# Patient Record
Sex: FEMALE | Race: WHITE | Employment: FULL TIME | URBAN - METROPOLITAN AREA
[De-identification: names, ages, dates, MRNs, and addresses within clinical notes are randomized per-mention and may not be internally consistent; named-entity substitution may affect disease eponyms.]

---

## 2024-10-16 ENCOUNTER — PREP FOR PROCEDURE (OUTPATIENT)
Age: 54
End: 2024-10-16

## 2024-10-16 PROBLEM — K51.90 MILD CHRONIC ULCERATIVE COLITIS (HCC): Status: ACTIVE | Noted: 2024-10-16

## 2024-10-16 NOTE — PERIOP NOTE
PAT  Received: Today  Camelia Hummel Ashaki N; Ann Sanchez  Patients surgery is scheduled for 11/26.  Needing PAT scheduled the week of 10/23    Thanks  Camelia    Pat: 10-25 @ 9;30 scheduled by Janis through \"[employer]\"

## 2024-10-25 ENCOUNTER — HOSPITAL ENCOUNTER (OUTPATIENT)
Facility: HOSPITAL | Age: 54
End: 2024-10-25
Payer: COMMERCIAL

## 2024-10-25 ENCOUNTER — HOSPITAL ENCOUNTER (OUTPATIENT)
Facility: HOSPITAL | Age: 54
Discharge: HOME | End: 2024-10-28
Payer: COMMERCIAL

## 2024-10-25 VITALS
HEIGHT: 64 IN | SYSTOLIC BLOOD PRESSURE: 116 MMHG | BODY MASS INDEX: 42.46 KG/M2 | HEART RATE: 71 BPM | DIASTOLIC BLOOD PRESSURE: 76 MMHG | TEMPERATURE: 97.9 F | WEIGHT: 248.68 LBS

## 2024-10-25 DIAGNOSIS — E66.01 MORBID OBESITY: ICD-10-CM

## 2024-10-25 LAB
ALBUMIN SERPL-MCNC: 3.4 G/DL (ref 3.5–5)
ALBUMIN/GLOB SERPL: 1.3 (ref 1.1–2.2)
ALP SERPL-CCNC: 98 U/L (ref 45–117)
ALT SERPL-CCNC: 23 U/L (ref 12–78)
ANION GAP SERPL CALC-SCNC: 5 MMOL/L (ref 2–12)
AST SERPL-CCNC: 6 U/L (ref 15–37)
BASOPHILS # BLD: 0 K/UL (ref 0–0.1)
BASOPHILS NFR BLD: 1 % (ref 0–1)
BILIRUB SERPL-MCNC: 0.3 MG/DL (ref 0.2–1)
BUN SERPL-MCNC: 17 MG/DL (ref 6–20)
BUN/CREAT SERPL: 24 (ref 12–20)
CALCIUM SERPL-MCNC: 10 MG/DL (ref 8.5–10.1)
CHLORIDE SERPL-SCNC: 107 MMOL/L (ref 97–108)
CO2 SERPL-SCNC: 28 MMOL/L (ref 21–32)
CREAT SERPL-MCNC: 0.72 MG/DL (ref 0.55–1.02)
DIFFERENTIAL METHOD BLD: ABNORMAL
EKG ATRIAL RATE: 62 BPM
EKG DIAGNOSIS: NORMAL
EKG P AXIS: 49 DEGREES
EKG P-R INTERVAL: 204 MS
EKG Q-T INTERVAL: 430 MS
EKG QRS DURATION: 98 MS
EKG QTC CALCULATION (BAZETT): 436 MS
EKG R AXIS: 68 DEGREES
EKG T AXIS: 27 DEGREES
EKG VENTRICULAR RATE: 62 BPM
EOSINOPHIL # BLD: 0.1 K/UL (ref 0–0.4)
EOSINOPHIL NFR BLD: 2 % (ref 0–7)
ERYTHROCYTE [DISTWIDTH] IN BLOOD BY AUTOMATED COUNT: 14.6 % (ref 11.5–14.5)
GLOBULIN SER CALC-MCNC: 2.7 G/DL (ref 2–4)
GLUCOSE SERPL-MCNC: 123 MG/DL (ref 65–100)
HCT VFR BLD AUTO: 40.9 % (ref 35–47)
HGB BLD-MCNC: 13.1 G/DL (ref 11.5–16)
IMM GRANULOCYTES # BLD AUTO: 0 K/UL (ref 0–0.04)
IMM GRANULOCYTES NFR BLD AUTO: 0 % (ref 0–0.5)
LYMPHOCYTES # BLD: 2.1 K/UL (ref 0.8–3.5)
LYMPHOCYTES NFR BLD: 34 % (ref 12–49)
MCH RBC QN AUTO: 24.9 PG (ref 26–34)
MCHC RBC AUTO-ENTMCNC: 32 G/DL (ref 30–36.5)
MCV RBC AUTO: 77.8 FL (ref 80–99)
MONOCYTES # BLD: 0.3 K/UL (ref 0–1)
MONOCYTES NFR BLD: 5 % (ref 5–13)
NEUTS SEG # BLD: 3.6 K/UL (ref 1.8–8)
NEUTS SEG NFR BLD: 58 % (ref 32–75)
NRBC # BLD: 0 K/UL (ref 0–0.01)
NRBC BLD-RTO: 0 PER 100 WBC
PLATELET # BLD AUTO: 210 K/UL (ref 150–400)
PMV BLD AUTO: 12.3 FL (ref 8.9–12.9)
POTASSIUM SERPL-SCNC: 3.8 MMOL/L (ref 3.5–5.1)
PROT SERPL-MCNC: 6.1 G/DL (ref 6.4–8.2)
RBC # BLD AUTO: 5.26 M/UL (ref 3.8–5.2)
SODIUM SERPL-SCNC: 140 MMOL/L (ref 136–145)
WBC # BLD AUTO: 6.1 K/UL (ref 3.6–11)

## 2024-10-25 PROCEDURE — 80053 COMPREHEN METABOLIC PANEL: CPT

## 2024-10-25 PROCEDURE — 85025 COMPLETE CBC W/AUTO DIFF WBC: CPT

## 2024-10-25 PROCEDURE — 93010 ELECTROCARDIOGRAM REPORT: CPT | Performed by: SPECIALIST

## 2024-10-25 PROCEDURE — 93005 ELECTROCARDIOGRAM TRACING: CPT

## 2024-10-25 PROCEDURE — 36415 COLL VENOUS BLD VENIPUNCTURE: CPT

## 2024-10-25 NOTE — PERIOP NOTE
[address] [address]   MAIN OR                                  [phone]    MAIN PRE OP             [phone]                                                                                AMBULATORY PRE OP          [phone]  PRE-ADMISSION TESTING    [phone]     Surgery Date:  11/26/2024       Is surgery arrival time given by surgeon?  YES  NO    If “NO”, [facility] staff will call you between 4 and 7pm the day before your surgery with your arrival time. (If your surgery is on a Monday, we will call you the Friday before.)    Call [phone] after 7pm Monday-Friday if you did not receive this call.    INSTRUCTIONS BEFORE YOUR SURGERY   When You  Arrive Arrive at [facility] Patient Access on 1st floor the day of your surgery.  Have your insurance card, photo ID,living will/advanced directive/POA (if applicable),  and any copayment (if needed)   Food   and   Drink SEE DIET INSTRUCTIONS BELOW     Absolutely NO alcohol of any kind 2 weeks before surgery and continue to avoid after surgery. Alcohol is not safe before or after surgery. Please discuss with your bariatric provider before resuming alcohol use. You must be 100% smoke free (tobacco and marijuana - smoking or edibles) for at least 3 months prior to surgery. Surgery will be cancelled if you are smoking. Stop smoking before surgery (helps w/healing and breathing).   Medications to   TAKE   Morning of Surgery MEDICATIONS TO TAKE THE MORNING OF SURGERY WITH A SIP OF WATER:   PREGABALIN, CITALOPRAM, ATORVASTATIN, EZETIMIBE, AMLODIPINE, OMEPRAZOLE  You may take these medications, IF NEEDED, the morning of surgery: LORAZEPAM,   IF NEEDED, take the following medication 4 hours prior to arrival to surgery:  BACLOFEN  ASK SURGEON/PRESCRIBING DOCTOR WHETHER TO STOP OR HOLD THE FOLLOWING MEDICATIONS:  MELOXICAM   Medications to STOP  before surgery HOLD THE FOLLOWING MEDICATIONS THE  told differently   Special Instructions Free  parking available from 6 AM until 4:30 PM.       Eating and Drinking Before Bariatric Surgery    Continue your liver shrinking diet until the night before surgery. You may eat your liver shrinking diet at the usual time on the day before your surgery.  Do NOT eat any solid foods after midnight.  You may drink clear liquids only from 12 midnight until 1 hours prior to your arrival time at the hospital on the day of your surgery. Do NOT drink alcohol.  Clear liquids include:  Water  Flavored, sugar-free water  Crystal Light, [place] or sugar free generic  Sugar-free You-Aid or generic  Decaffeinated tea or coffee  Vitamin Water Zero  Powerade Zero  Gatorade Zero  Clear Protein drinks  Diet V-8 Splash  Diet Snapple  Diet Lemonade  You may drink up to 12 ounces at one time every 4 hours between the hours of midnight and 1 hour before your arrival time at the hospital. Example- if your arrival time at the hospital is 6am, you may drink 12 ounces of clear liquids no later than 5am.  If you have any questions, please contact your surgeon's office.     Preventing Infections Before and After - Your Surgery    IMPORTANT INSTRUCTIONS      You play an important role in your health and preparation for surgery. To reduce the germs on your skin you will need to shower with CHG soap (Chorhexidine gluconate 4%) two times before surgery.    CHG soap (Hibiclens, Hex-A-Clens or store brand)  CHG soap will be provided at your Preadmission Testing (PAT) appointment.  If you do not have a PAT appointment before surgery, you may arrange to  CHG soap from our office or purchase CHG soap at a pharmacy, grocery or department store.  You need to purchase TWO 4 ounce bottles to use for your 2 showers.    Steps to follow:  Wash your hair with your normal shampoo and your body with regular soap and rinse well to remove shampoo and soap from your skin.  Wet a clean washcloth and turn off the

## 2024-10-28 ENCOUNTER — TELEPHONE (OUTPATIENT)
Age: 54
End: 2024-10-28

## 2024-10-28 NOTE — TELEPHONE ENCOUNTER
I called patient, 2 patient identifiers used. I made patient aware we need a medical release form to be completed for FMLA forms, I made her aware I will have  staff send form via [employer]. Patient verbalized understanding and stated that is great and she will get it do as soon as possible and thanked for call.

## 2024-10-29 ENCOUNTER — TELEMEDICINE (OUTPATIENT)
Age: 54
End: 2024-10-29

## 2024-10-29 DIAGNOSIS — K21.9 CHRONIC GERD: ICD-10-CM

## 2024-10-29 DIAGNOSIS — E66.01 MORBID OBESITY: Primary | ICD-10-CM

## 2024-10-29 DIAGNOSIS — I10 PRIMARY HYPERTENSION: ICD-10-CM

## 2024-10-29 DIAGNOSIS — K51.919 MILD CHRONIC ULCERATIVE COLITIS WITH COMPLICATION (HCC): ICD-10-CM

## 2024-10-29 DIAGNOSIS — G89.29 CHRONIC BACK PAIN, UNSPECIFIED BACK LOCATION, UNSPECIFIED BACK PAIN LATERALITY: ICD-10-CM

## 2024-10-29 DIAGNOSIS — M54.9 CHRONIC BACK PAIN, UNSPECIFIED BACK LOCATION, UNSPECIFIED BACK PAIN LATERALITY: ICD-10-CM

## 2024-10-29 DIAGNOSIS — E78.2 MIXED HYPERLIPIDEMIA: ICD-10-CM

## 2024-10-29 PROCEDURE — 99024 POSTOP FOLLOW-UP VISIT: CPT | Performed by: NURSE PRACTITIONER

## 2024-10-29 RX ORDER — ONDANSETRON 4 MG/1
4 TABLET, FILM COATED ORAL EVERY 8 HOURS PRN
Qty: 30 TABLET | Refills: 0 | Status: SHIPPED | OUTPATIENT
Start: 2024-10-29

## 2024-10-29 RX ORDER — OMEPRAZOLE 40 MG/1
40 CAPSULE, DELAYED RELEASE ORAL
Qty: 90 CAPSULE | Refills: 1 | Status: SHIPPED | OUTPATIENT
Start: 2024-10-29

## 2024-10-29 RX ORDER — POLYETHYLENE GLYCOL 3350 17 G/17G
17 POWDER, FOR SOLUTION ORAL DAILY
Qty: 1530 G | Refills: 0 | Status: SHIPPED | OUTPATIENT
Start: 2024-10-29 | End: 2025-01-27

## 2024-10-29 ASSESSMENT — PATIENT HEALTH QUESTIONNAIRE - PHQ9
1. LITTLE INTEREST OR PLEASURE IN DOING THINGS: NOT AT ALL
SUM OF ALL RESPONSES TO PHQ QUESTIONS 1-9: 0
SUM OF ALL RESPONSES TO PHQ QUESTIONS 1-9: 0
SUM OF ALL RESPONSES TO PHQ9 QUESTIONS 1 & 2: 0
SUM OF ALL RESPONSES TO PHQ QUESTIONS 1-9: 0
2. FEELING DOWN, DEPRESSED OR HOPELESS: NOT AT ALL
SUM OF ALL RESPONSES TO PHQ QUESTIONS 1-9: 0

## 2024-10-29 NOTE — PROGRESS NOTES
LMP  (LMP Unknown)   Todays weight is 248 lbs.   Last weight in October 241 lbs.     Plan:   1/2. Morbid Obesity- Patient is schedule for Malabsorptive Gastric bypass with Dr.Nathan Schulte on 11/26/2024 at [facility]. Counseled patient in regard to post diet restrictions, follow- up office visits, follow- up care. Patient as received educational booklet and vitamin list. Reviewed liver shrinking diet. Start date for Liver shrinking diet 11/12/2024  ERAS protocol reviewed:  Acetaminophen 1000 mg at noon and 8 pm day before surgery and may have clear liquids (no caffeine, no ETOH, no carbonation and calorie free) until 3 hours prior to surgery   Preadmission testing results reviewed with patient   Post operative prescriptions sent to pharmacy on file for AFTER surgery:     Acid suppression  Prilosec 40 mg x 30 days, then reassess need    Antiemetic Zofran 4 mg every 8 hours as needed for nausea #30   Antispasmodic na  Laxative:  Miralax 1 packet every day   DVT prophylaxis:  early ambulation and mechanical compression, non-smoker for at least 90 days   Post op pain management:  Patient is on lyrica 100 mg tid. She will continue after surgery. ; acetaminophen 1000 mg po q 8 hours prn; abdominal support / splinting; heating pad prn   Reviewed with patient that lifelong vitamin supplementation is recommended by provider as well as risks of non-compliance.    3/4 Htn and hyperlipidemia- follow up with PCP 4 weeks after surgery.   5. GERD- increased prilosec to 40 mg from 10 mg postoperatively,   6. Ulcerative Colitis- uses canasa for flares. None recently  7.Chronic back pain- takes Lyrica. .   Pt verbalized understanding and questions were answered to the best of my knowledge and ability.        Jennifer Hooper, was evaluated through a synchronous (real-time) audio-video encounter. The patient (or guardian if applicable) is aware that this is a billable service, which includes applicable co-pays. This Virtual Visit

## 2024-10-29 NOTE — PROGRESS NOTES
Identified patient with two patient identifiers (name and ). Reviewed chart in preparation for visit and have obtained necessary documentation.    Jennifer Hooper is a 53 y.o. female  Chief Complaint   Patient presents with    Follow-up     H & P,  LAPAROSCOPIC GASTRIC BYPASS WITH PARAESOPHAGEAL HERNIA REPAIR, EGD *ERAS* 24     LMP  (LMP Unknown)     1. Have you been to the ER, urgent care clinic since your last visit?  Hospitalized since your last visit?no    2. Have you seen or consulted any other health care providers outside of the [facility] since your last visit?  Include any pap smears or colon screening. no

## 2024-11-06 ENCOUNTER — CLINICAL DOCUMENTATION (OUTPATIENT)
Age: 54
End: 2024-11-06

## 2024-11-11 ENCOUNTER — TELEPHONE (OUTPATIENT)
Age: 54
End: 2024-11-11

## 2024-11-11 NOTE — TELEPHONE ENCOUNTER
Attempted to contact patient to reschedule appt on 12/19. As I was leaving , patient answered phone and rescheduled to 12/18 with SENAIT Tenorio.

## 2024-11-20 ENCOUNTER — OFFICE VISIT (OUTPATIENT)
Age: 54
End: 2024-11-20
Payer: COMMERCIAL

## 2024-11-20 VITALS
TEMPERATURE: 98.7 F | RESPIRATION RATE: 16 BRPM | BODY MASS INDEX: 40.8 KG/M2 | WEIGHT: 239 LBS | HEART RATE: 80 BPM | SYSTOLIC BLOOD PRESSURE: 121 MMHG | OXYGEN SATURATION: 97 % | HEIGHT: 64 IN | DIASTOLIC BLOOD PRESSURE: 79 MMHG

## 2024-11-20 DIAGNOSIS — E66.01 MORBID OBESITY: Primary | ICD-10-CM

## 2024-11-20 PROCEDURE — 3078F DIAST BP <80 MM HG: CPT | Performed by: SURGERY

## 2024-11-20 PROCEDURE — 99214 OFFICE O/P EST MOD 30 MIN: CPT | Performed by: SURGERY

## 2024-11-20 PROCEDURE — 3074F SYST BP LT 130 MM HG: CPT | Performed by: SURGERY

## 2024-11-20 ASSESSMENT — PATIENT HEALTH QUESTIONNAIRE - PHQ9
2. FEELING DOWN, DEPRESSED OR HOPELESS: NOT AT ALL
SUM OF ALL RESPONSES TO PHQ QUESTIONS 1-9: 0
SUM OF ALL RESPONSES TO PHQ QUESTIONS 1-9: 0
1. LITTLE INTEREST OR PLEASURE IN DOING THINGS: NOT AT ALL
SUM OF ALL RESPONSES TO PHQ QUESTIONS 1-9: 0
SUM OF ALL RESPONSES TO PHQ9 QUESTIONS 1 & 2: 0
SUM OF ALL RESPONSES TO PHQ QUESTIONS 1-9: 0

## 2024-11-20 NOTE — PROGRESS NOTES
Identified pt with two pt identifiers (name and ). Reviewed chart in preparation for visit and have obtained necessary documentation.    Jennifer Hooper is a 54 y.o. female  Chief Complaint   Patient presents with    Establish Care     MTD:Bariatric lap gastric bypass with paraesophageal hernia repair on      /79 (Site: Left Upper Arm, Position: Sitting, Cuff Size: Large Adult)   Pulse 80   Temp 98.7 °F (37.1 °C) (Oral)   Resp 16   Ht 1.626 m (5' 4\")   Wt 108.4 kg (239 lb)   LMP  (LMP Unknown)   SpO2 97%   BMI 41.02 kg/m²     1. Have you been to the ER, urgent care clinic since your last visit?  Hospitalized since your last visit?no    2. Have you seen or consulted any other health care providers outside of the [facility] since your last visit?  Include any pap smears or colon screening. no   
    Social Determinants of Health     Financial Resource Strain: Not on file   Food Insecurity: Not on file   Transportation Needs: Not on file   Physical Activity: Not on file   Stress: Not on file   Social Connections: Not on file   Intimate Partner Violence: Not on file   Housing Stability: Not on file       Family History   Problem Relation Age of Onset    High Blood Pressure Mother     Obesity Mother         HAD GASTRIC BY-PASS    Other Mother         SEPSIS D/T (COMPLICATION 20 YR LATER) BYPASS SECONDARY SURGERY    Diabetes Father     Heart Disease Father     High Blood Pressure Father     High Cholesterol Father     Prostate Cancer Father     Obesity Sister         GASTRIC BY-PASS    Atrial Fibrillation Sister         ABLATION    Gall Bladder Disease Sister     Arthritis Sister     Obesity Sister         GASTRIC BYPASS    Arthritis Brother     Lung Disease Brother     Sleep Apnea Brother     Cancer Brother         ORAL    Seizures Brother     Obesity Brother     Diabetes Brother     Anesth Problems Neg Hx        ROS   Constitutional: negative  Ears, Nose, Mouth, Throat, and Face: negative  Respiratory: negative  Cardiovascular: negative  Gastrointestinal: negative  Genitourinary:negative  Integument/Breast: negative  Hematologic/Lymphatic: negative  Behavioral/Psychiatric: negative  Allergic/Immunologic: negative      Physical Exam:     Vitals:    11/20/24 0853   BP: 121/79   Pulse: 80   Resp: 16   Temp: 98.7 °F (37.1 °C)   SpO2: 97%     Last Weight Metrics:      11/20/2024     8:53 AM 10/25/2024     9:25 AM 10/9/2024    11:41 AM 9/25/2024     2:52 PM 7/25/2024     8:13 AM 3/28/2024     3:35 PM 12/28/2021    11:18 AM   Weight Loss Metrics   Height 5' 4\" 5' 4\" 5' 3\" 5' 3\" 5' 3\" 5' 3\" 5' 3\"   Weight - Scale 239 lbs 248 lbs 11 oz 241 lbs 250 lbs 3 oz 247 lbs 242 lbs 3 oz 246 lbs   BMI (Calculated) 41.1 kg/m2 42.8 kg/m2 42.8 kg/m2 44.4 kg/m2 43.8 kg/m2 43 kg/m2 43.7 kg/m2        General - alert and oriented, no

## 2024-11-26 ENCOUNTER — HOSPITAL ENCOUNTER (INPATIENT)
Facility: HOSPITAL | Age: 54
LOS: 1 days | Discharge: HOME | DRG: 620 | End: 2024-11-27
Attending: SURGERY | Admitting: SURGERY
Payer: COMMERCIAL

## 2024-11-26 ENCOUNTER — ANESTHESIA EVENT (OUTPATIENT)
Facility: HOSPITAL | Age: 54
DRG: 620 | End: 2024-11-26
Payer: COMMERCIAL

## 2024-11-26 ENCOUNTER — ANESTHESIA (OUTPATIENT)
Facility: HOSPITAL | Age: 54
DRG: 620 | End: 2024-11-26
Payer: COMMERCIAL

## 2024-11-26 DIAGNOSIS — E66.01 MORBID OBESITY WITH BMI OF 40.0-44.9, ADULT: Primary | ICD-10-CM

## 2024-11-26 DIAGNOSIS — K44.9 PARAESOPHAGEAL HERNIA: ICD-10-CM

## 2024-11-26 PROCEDURE — 2500000003 HC RX 250 WO HCPCS: Performed by: NURSE ANESTHETIST, CERTIFIED REGISTERED

## 2024-11-26 PROCEDURE — 1200000000 HC SEMI PRIVATE

## 2024-11-26 PROCEDURE — 43644 LAP GASTRIC BYPASS/ROUX-EN-Y: CPT | Performed by: SURGERY

## 2024-11-26 PROCEDURE — 0D164ZA BYPASS STOMACH TO JEJUNUM, PERCUTANEOUS ENDOSCOPIC APPROACH: ICD-10-PCS | Performed by: SURGERY

## 2024-11-26 PROCEDURE — 7100000000 HC PACU RECOVERY - FIRST 15 MIN: Performed by: SURGERY

## 2024-11-26 PROCEDURE — 7100000001 HC PACU RECOVERY - ADDTL 15 MIN: Performed by: SURGERY

## 2024-11-26 PROCEDURE — 2500000003 HC RX 250 WO HCPCS: Performed by: SURGERY

## 2024-11-26 PROCEDURE — 43644 LAP GASTRIC BYPASS/ROUX-EN-Y: CPT | Performed by: PHYSICIAN ASSISTANT

## 2024-11-26 PROCEDURE — 2720000010 HC SURG SUPPLY STERILE: Performed by: SURGERY

## 2024-11-26 PROCEDURE — 3700000001 HC ADD 15 MINUTES (ANESTHESIA): Performed by: SURGERY

## 2024-11-26 PROCEDURE — 3700000000 HC ANESTHESIA ATTENDED CARE: Performed by: SURGERY

## 2024-11-26 PROCEDURE — C1781 MESH (IMPLANTABLE): HCPCS | Performed by: SURGERY

## 2024-11-26 PROCEDURE — 6370000000 HC RX 637 (ALT 250 FOR IP): Performed by: SURGERY

## 2024-11-26 PROCEDURE — 43282 LAP PARAESOPH HER RPR W/MESH: CPT | Performed by: SURGERY

## 2024-11-26 PROCEDURE — G0378 HOSPITAL OBSERVATION PER HR: HCPCS

## 2024-11-26 PROCEDURE — 6360000002 HC RX W HCPCS: Performed by: ANESTHESIOLOGY

## 2024-11-26 PROCEDURE — P9045 ALBUMIN (HUMAN), 5%, 250 ML: HCPCS | Performed by: NURSE ANESTHETIST, CERTIFIED REGISTERED

## 2024-11-26 PROCEDURE — 3600000004 HC SURGERY LEVEL 4 BASE: Performed by: SURGERY

## 2024-11-26 PROCEDURE — 2709999900 HC NON-CHARGEABLE SUPPLY: Performed by: SURGERY

## 2024-11-26 PROCEDURE — 6360000002 HC RX W HCPCS: Performed by: NURSE ANESTHETIST, CERTIFIED REGISTERED

## 2024-11-26 PROCEDURE — 2580000003 HC RX 258: Performed by: SURGERY

## 2024-11-26 PROCEDURE — 3600000014 HC SURGERY LEVEL 4 ADDTL 15MIN: Performed by: SURGERY

## 2024-11-26 PROCEDURE — 0BUT4JZ SUPPLEMENT DIAPHRAGM WITH SYNTHETIC SUBSTITUTE, PERCUTANEOUS ENDOSCOPIC APPROACH: ICD-10-PCS | Performed by: SURGERY

## 2024-11-26 PROCEDURE — 6360000002 HC RX W HCPCS: Performed by: SURGERY

## 2024-11-26 PROCEDURE — 88307 TISSUE EXAM BY PATHOLOGIST: CPT

## 2024-11-26 PROCEDURE — 0DJ08ZZ INSPECTION OF UPPER INTESTINAL TRACT, VIA NATURAL OR ARTIFICIAL OPENING ENDOSCOPIC: ICD-10-PCS | Performed by: SURGERY

## 2024-11-26 PROCEDURE — 43282 LAP PARAESOPH HER RPR W/MESH: CPT | Performed by: PHYSICIAN ASSISTANT

## 2024-11-26 PROCEDURE — 2580000003 HC RX 258: Performed by: NURSE ANESTHETIST, CERTIFIED REGISTERED

## 2024-11-26 DEVICE — BIO-A TISSUE REINFORCEMENT 7CMX10CM
Type: IMPLANTABLE DEVICE | Site: ABDOMEN | Status: FUNCTIONAL
Brand: GORE BIO-A TISSUE REINFORCEMENT

## 2024-11-26 RX ORDER — CITALOPRAM HYDROBROMIDE 20 MG/1
40 TABLET ORAL EVERY MORNING
Status: DISCONTINUED | OUTPATIENT
Start: 2024-11-27 | End: 2024-11-27 | Stop reason: HOSPADM

## 2024-11-26 RX ORDER — BUPIVACAINE HYDROCHLORIDE AND EPINEPHRINE 5; 5 MG/ML; UG/ML
INJECTION, SOLUTION EPIDURAL; INTRACAUDAL; PERINEURAL PRN
Status: DISCONTINUED | OUTPATIENT
Start: 2024-11-26 | End: 2024-11-26 | Stop reason: HOSPADM

## 2024-11-26 RX ORDER — DIPHENHYDRAMINE HYDROCHLORIDE 50 MG/ML
25 INJECTION INTRAMUSCULAR; INTRAVENOUS EVERY 6 HOURS PRN
Status: DISCONTINUED | OUTPATIENT
Start: 2024-11-26 | End: 2024-11-27 | Stop reason: HOSPADM

## 2024-11-26 RX ORDER — HYDRALAZINE HYDROCHLORIDE 20 MG/ML
10 INJECTION INTRAMUSCULAR; INTRAVENOUS
Status: DISCONTINUED | OUTPATIENT
Start: 2024-11-26 | End: 2024-11-26 | Stop reason: HOSPADM

## 2024-11-26 RX ORDER — MIDAZOLAM HYDROCHLORIDE 2 MG/2ML
2 INJECTION, SOLUTION INTRAMUSCULAR; INTRAVENOUS
Status: DISCONTINUED | OUTPATIENT
Start: 2024-11-26 | End: 2024-11-26 | Stop reason: HOSPADM

## 2024-11-26 RX ORDER — ROCURONIUM BROMIDE 10 MG/ML
INJECTION, SOLUTION INTRAVENOUS
Status: DISCONTINUED | OUTPATIENT
Start: 2024-11-26 | End: 2024-11-26 | Stop reason: SDUPTHER

## 2024-11-26 RX ORDER — ACETAMINOPHEN 325 MG/1
650 TABLET ORAL EVERY 6 HOURS
Status: DISCONTINUED | OUTPATIENT
Start: 2024-11-26 | End: 2024-11-27 | Stop reason: HOSPADM

## 2024-11-26 RX ORDER — PANTOPRAZOLE SODIUM 40 MG/1
40 TABLET, DELAYED RELEASE ORAL DAILY
Status: DISCONTINUED | OUTPATIENT
Start: 2024-11-26 | End: 2024-11-27 | Stop reason: HOSPADM

## 2024-11-26 RX ORDER — METRONIDAZOLE 500 MG/100ML
500 INJECTION, SOLUTION INTRAVENOUS
Status: COMPLETED | OUTPATIENT
Start: 2024-11-26 | End: 2024-11-26

## 2024-11-26 RX ORDER — SCOLOPAMINE TRANSDERMAL SYSTEM 1 MG/1
1 PATCH, EXTENDED RELEASE TRANSDERMAL
Status: DISCONTINUED | OUTPATIENT
Start: 2024-11-26 | End: 2024-11-26

## 2024-11-26 RX ORDER — MEPERIDINE HYDROCHLORIDE 25 MG/ML
12.5 INJECTION INTRAMUSCULAR; INTRAVENOUS; SUBCUTANEOUS EVERY 5 MIN PRN
Status: DISCONTINUED | OUTPATIENT
Start: 2024-11-26 | End: 2024-11-26 | Stop reason: HOSPADM

## 2024-11-26 RX ORDER — PROCHLORPERAZINE EDISYLATE 5 MG/ML
5 INJECTION INTRAMUSCULAR; INTRAVENOUS
Status: DISCONTINUED | OUTPATIENT
Start: 2024-11-26 | End: 2024-11-26 | Stop reason: HOSPADM

## 2024-11-26 RX ORDER — ALBUMIN HUMAN 50 G/1000ML
SOLUTION INTRAVENOUS
Status: DISCONTINUED | OUTPATIENT
Start: 2024-11-26 | End: 2024-11-26 | Stop reason: SDUPTHER

## 2024-11-26 RX ORDER — SIMETHICONE 80 MG
80 TABLET,CHEWABLE ORAL EVERY 6 HOURS PRN
Status: DISCONTINUED | OUTPATIENT
Start: 2024-11-26 | End: 2024-11-27 | Stop reason: HOSPADM

## 2024-11-26 RX ORDER — SODIUM CHLORIDE, SODIUM LACTATE, POTASSIUM CHLORIDE, CALCIUM CHLORIDE 600; 310; 30; 20 MG/100ML; MG/100ML; MG/100ML; MG/100ML
INJECTION, SOLUTION INTRAVENOUS
Status: DISCONTINUED | OUTPATIENT
Start: 2024-11-26 | End: 2024-11-26 | Stop reason: SDUPTHER

## 2024-11-26 RX ORDER — SODIUM CHLORIDE 0.9 % (FLUSH) 0.9 %
5-40 SYRINGE (ML) INJECTION PRN
Status: DISCONTINUED | OUTPATIENT
Start: 2024-11-26 | End: 2024-11-27 | Stop reason: HOSPADM

## 2024-11-26 RX ORDER — SODIUM CHLORIDE, SODIUM LACTATE, POTASSIUM CHLORIDE, CALCIUM CHLORIDE 600; 310; 30; 20 MG/100ML; MG/100ML; MG/100ML; MG/100ML
INJECTION, SOLUTION INTRAVENOUS CONTINUOUS
Status: DISCONTINUED | OUTPATIENT
Start: 2024-11-26 | End: 2024-11-26 | Stop reason: HOSPADM

## 2024-11-26 RX ORDER — PHENYLEPHRINE HCL IN 0.9% NACL 0.4MG/10ML
SYRINGE (ML) INTRAVENOUS
Status: DISCONTINUED | OUTPATIENT
Start: 2024-11-26 | End: 2024-11-26 | Stop reason: SDUPTHER

## 2024-11-26 RX ORDER — HYDRALAZINE HYDROCHLORIDE 20 MG/ML
20 INJECTION INTRAMUSCULAR; INTRAVENOUS EVERY 6 HOURS PRN
Status: DISCONTINUED | OUTPATIENT
Start: 2024-11-26 | End: 2024-11-27 | Stop reason: HOSPADM

## 2024-11-26 RX ORDER — DEXAMETHASONE SODIUM PHOSPHATE 4 MG/ML
INJECTION, SOLUTION INTRA-ARTICULAR; INTRALESIONAL; INTRAMUSCULAR; INTRAVENOUS; SOFT TISSUE
Status: DISCONTINUED | OUTPATIENT
Start: 2024-11-26 | End: 2024-11-26 | Stop reason: SDUPTHER

## 2024-11-26 RX ORDER — PREGABALIN 25 MG/1
100 CAPSULE ORAL 3 TIMES DAILY
Status: DISCONTINUED | OUTPATIENT
Start: 2024-11-26 | End: 2024-11-27 | Stop reason: HOSPADM

## 2024-11-26 RX ORDER — NALOXONE HYDROCHLORIDE 0.4 MG/ML
INJECTION, SOLUTION INTRAMUSCULAR; INTRAVENOUS; SUBCUTANEOUS PRN
Status: DISCONTINUED | OUTPATIENT
Start: 2024-11-26 | End: 2024-11-26 | Stop reason: HOSPADM

## 2024-11-26 RX ORDER — SODIUM CHLORIDE 9 MG/ML
INJECTION, SOLUTION INTRAVENOUS PRN
Status: DISCONTINUED | OUTPATIENT
Start: 2024-11-26 | End: 2024-11-26 | Stop reason: HOSPADM

## 2024-11-26 RX ORDER — ONDANSETRON 2 MG/ML
4 INJECTION INTRAMUSCULAR; INTRAVENOUS EVERY 6 HOURS PRN
Status: DISCONTINUED | OUTPATIENT
Start: 2024-11-26 | End: 2024-11-27 | Stop reason: HOSPADM

## 2024-11-26 RX ORDER — SODIUM CHLORIDE 0.9 % (FLUSH) 0.9 %
5-40 SYRINGE (ML) INJECTION EVERY 12 HOURS SCHEDULED
Status: DISCONTINUED | OUTPATIENT
Start: 2024-11-26 | End: 2024-11-26 | Stop reason: HOSPADM

## 2024-11-26 RX ORDER — HYDROMORPHONE HYDROCHLORIDE 1 MG/ML
0.5 INJECTION, SOLUTION INTRAMUSCULAR; INTRAVENOUS; SUBCUTANEOUS EVERY 5 MIN PRN
Status: DISCONTINUED | OUTPATIENT
Start: 2024-11-26 | End: 2024-11-26 | Stop reason: HOSPADM

## 2024-11-26 RX ORDER — DIPHENHYDRAMINE HYDROCHLORIDE 50 MG/ML
12.5 INJECTION INTRAMUSCULAR; INTRAVENOUS
Status: DISCONTINUED | OUTPATIENT
Start: 2024-11-26 | End: 2024-11-26 | Stop reason: HOSPADM

## 2024-11-26 RX ORDER — DIPHENHYDRAMINE HCL 25 MG
25 CAPSULE ORAL EVERY 6 HOURS PRN
Status: DISCONTINUED | OUTPATIENT
Start: 2024-11-26 | End: 2024-11-27 | Stop reason: HOSPADM

## 2024-11-26 RX ORDER — MIDAZOLAM HYDROCHLORIDE 1 MG/ML
INJECTION, SOLUTION INTRAMUSCULAR; INTRAVENOUS
Status: DISCONTINUED | OUTPATIENT
Start: 2024-11-26 | End: 2024-11-26 | Stop reason: SDUPTHER

## 2024-11-26 RX ORDER — AMLODIPINE BESYLATE 5 MG/1
10 TABLET ORAL EVERY MORNING
Status: DISCONTINUED | OUTPATIENT
Start: 2024-11-27 | End: 2024-11-27 | Stop reason: HOSPADM

## 2024-11-26 RX ORDER — ACETAMINOPHEN 160 MG/5ML
650 LIQUID ORAL ONCE
Status: COMPLETED | OUTPATIENT
Start: 2024-11-26 | End: 2024-11-26

## 2024-11-26 RX ORDER — SODIUM CHLORIDE 0.9 % (FLUSH) 0.9 %
5-40 SYRINGE (ML) INJECTION EVERY 12 HOURS SCHEDULED
Status: DISCONTINUED | OUTPATIENT
Start: 2024-11-26 | End: 2024-11-27 | Stop reason: HOSPADM

## 2024-11-26 RX ORDER — SODIUM CHLORIDE 0.9 % (FLUSH) 0.9 %
5-40 SYRINGE (ML) INJECTION PRN
Status: DISCONTINUED | OUTPATIENT
Start: 2024-11-26 | End: 2024-11-26 | Stop reason: HOSPADM

## 2024-11-26 RX ORDER — SODIUM CHLORIDE 9 MG/ML
INJECTION, SOLUTION INTRAVENOUS CONTINUOUS
Status: DISCONTINUED | OUTPATIENT
Start: 2024-11-26 | End: 2024-11-26 | Stop reason: HOSPADM

## 2024-11-26 RX ORDER — ONDANSETRON 2 MG/ML
4 INJECTION INTRAMUSCULAR; INTRAVENOUS
Status: DISCONTINUED | OUTPATIENT
Start: 2024-11-26 | End: 2024-11-26 | Stop reason: HOSPADM

## 2024-11-26 RX ORDER — LIDOCAINE HYDROCHLORIDE ANHYDROUS AND DEXTROSE MONOHYDRATE 5; 400 G/100ML; MG/100ML
INJECTION, SOLUTION INTRAVENOUS
Status: DISCONTINUED | OUTPATIENT
Start: 2024-11-26 | End: 2024-11-26 | Stop reason: SDUPTHER

## 2024-11-26 RX ORDER — LORAZEPAM 2 MG/ML
1 INJECTION INTRAMUSCULAR EVERY 6 HOURS PRN
Status: DISCONTINUED | OUTPATIENT
Start: 2024-11-26 | End: 2024-11-27 | Stop reason: HOSPADM

## 2024-11-26 RX ORDER — SCOLOPAMINE TRANSDERMAL SYSTEM 1 MG/1
1 PATCH, EXTENDED RELEASE TRANSDERMAL
Status: DISCONTINUED | OUTPATIENT
Start: 2024-11-26 | End: 2024-11-27 | Stop reason: HOSPADM

## 2024-11-26 RX ORDER — ONDANSETRON 4 MG/1
4 TABLET, ORALLY DISINTEGRATING ORAL EVERY 8 HOURS PRN
Status: DISCONTINUED | OUTPATIENT
Start: 2024-11-26 | End: 2024-11-27 | Stop reason: HOSPADM

## 2024-11-26 RX ORDER — FENTANYL CITRATE 50 UG/ML
INJECTION, SOLUTION INTRAMUSCULAR; INTRAVENOUS
Status: DISCONTINUED | OUTPATIENT
Start: 2024-11-26 | End: 2024-11-26 | Stop reason: SDUPTHER

## 2024-11-26 RX ORDER — EPHEDRINE SULFATE 50 MG/ML
INJECTION INTRAVENOUS
Status: DISCONTINUED | OUTPATIENT
Start: 2024-11-26 | End: 2024-11-26 | Stop reason: SDUPTHER

## 2024-11-26 RX ORDER — PROPOFOL 10 MG/ML
INJECTION, EMULSION INTRAVENOUS
Status: DISCONTINUED | OUTPATIENT
Start: 2024-11-26 | End: 2024-11-26 | Stop reason: SDUPTHER

## 2024-11-26 RX ORDER — HYDROMORPHONE HYDROCHLORIDE 1 MG/ML
1 INJECTION, SOLUTION INTRAMUSCULAR; INTRAVENOUS; SUBCUTANEOUS
Status: DISCONTINUED | OUTPATIENT
Start: 2024-11-26 | End: 2024-11-27 | Stop reason: HOSPADM

## 2024-11-26 RX ORDER — LIDOCAINE HYDROCHLORIDE 20 MG/ML
INJECTION, SOLUTION EPIDURAL; INFILTRATION; INTRACAUDAL; PERINEURAL
Status: DISCONTINUED | OUTPATIENT
Start: 2024-11-26 | End: 2024-11-26 | Stop reason: SDUPTHER

## 2024-11-26 RX ORDER — MAGNESIUM SULFATE HEPTAHYDRATE 40 MG/ML
INJECTION, SOLUTION INTRAVENOUS
Status: DISCONTINUED | OUTPATIENT
Start: 2024-11-26 | End: 2024-11-26 | Stop reason: SDUPTHER

## 2024-11-26 RX ORDER — ONDANSETRON 2 MG/ML
4 INJECTION INTRAMUSCULAR; INTRAVENOUS ONCE
Status: DISCONTINUED | OUTPATIENT
Start: 2024-11-26 | End: 2024-11-26 | Stop reason: HOSPADM

## 2024-11-26 RX ORDER — MIDAZOLAM HYDROCHLORIDE 5 MG/5ML
5 INJECTION, SOLUTION INTRAMUSCULAR; INTRAVENOUS
Status: DISCONTINUED | OUTPATIENT
Start: 2024-11-26 | End: 2024-11-26 | Stop reason: HOSPADM

## 2024-11-26 RX ORDER — ONDANSETRON 2 MG/ML
INJECTION INTRAMUSCULAR; INTRAVENOUS
Status: DISCONTINUED | OUTPATIENT
Start: 2024-11-26 | End: 2024-11-26 | Stop reason: SDUPTHER

## 2024-11-26 RX ORDER — FENTANYL CITRATE 50 UG/ML
50 INJECTION, SOLUTION INTRAMUSCULAR; INTRAVENOUS EVERY 5 MIN PRN
Status: DISCONTINUED | OUTPATIENT
Start: 2024-11-26 | End: 2024-11-26 | Stop reason: HOSPADM

## 2024-11-26 RX ORDER — LISINOPRIL 20 MG/1
20 TABLET ORAL DAILY
Status: DISCONTINUED | OUTPATIENT
Start: 2024-11-26 | End: 2024-11-27 | Stop reason: HOSPADM

## 2024-11-26 RX ORDER — SUCCINYLCHOLINE/SOD CL,ISO/PF 200MG/10ML
SYRINGE (ML) INTRAVENOUS
Status: DISCONTINUED | OUTPATIENT
Start: 2024-11-26 | End: 2024-11-26 | Stop reason: SDUPTHER

## 2024-11-26 RX ORDER — FENTANYL CITRATE 50 UG/ML
100 INJECTION, SOLUTION INTRAMUSCULAR; INTRAVENOUS
Status: DISCONTINUED | OUTPATIENT
Start: 2024-11-26 | End: 2024-11-26 | Stop reason: HOSPADM

## 2024-11-26 RX ORDER — SODIUM CHLORIDE 9 MG/ML
INJECTION, SOLUTION INTRAVENOUS CONTINUOUS
Status: DISCONTINUED | OUTPATIENT
Start: 2024-11-26 | End: 2024-11-27 | Stop reason: HOSPADM

## 2024-11-26 RX ORDER — SODIUM CHLORIDE 9 MG/ML
INJECTION, SOLUTION INTRAVENOUS PRN
Status: DISCONTINUED | OUTPATIENT
Start: 2024-11-26 | End: 2024-11-27 | Stop reason: HOSPADM

## 2024-11-26 RX ADMIN — FENTANYL CITRATE 50 MCG: 50 INJECTION INTRAMUSCULAR; INTRAVENOUS at 12:00

## 2024-11-26 RX ADMIN — SIMETHICONE 80 MG: 80 TABLET, CHEWABLE ORAL at 13:16

## 2024-11-26 RX ADMIN — EPHEDRINE SULFATE 5 MG: 50 INJECTION INTRAVENOUS at 10:05

## 2024-11-26 RX ADMIN — ONDANSETRON 4 MG: 2 INJECTION INTRAMUSCULAR; INTRAVENOUS at 10:30

## 2024-11-26 RX ADMIN — SUGAMMADEX 200 MG: 100 INJECTION, SOLUTION INTRAVENOUS at 10:49

## 2024-11-26 RX ADMIN — Medication 120 MG: at 07:36

## 2024-11-26 RX ADMIN — MIDAZOLAM HYDROCHLORIDE 3 MG: 1 INJECTION, SOLUTION INTRAMUSCULAR; INTRAVENOUS at 07:26

## 2024-11-26 RX ADMIN — ALBUMIN (HUMAN) 12.5 G: 12.5 INJECTION, SOLUTION INTRAVENOUS at 10:06

## 2024-11-26 RX ADMIN — PROPOFOL 60 MG: 10 INJECTION, EMULSION INTRAVENOUS at 07:37

## 2024-11-26 RX ADMIN — EPHEDRINE SULFATE 5 MG: 50 INJECTION INTRAVENOUS at 08:10

## 2024-11-26 RX ADMIN — EPHEDRINE SULFATE 5 MG: 50 INJECTION INTRAVENOUS at 08:14

## 2024-11-26 RX ADMIN — ACETAMINOPHEN 650 MG: 325 TABLET ORAL at 19:10

## 2024-11-26 RX ADMIN — PREGABALIN 100 MG: 25 CAPSULE ORAL at 20:38

## 2024-11-26 RX ADMIN — PANTOPRAZOLE SODIUM 40 MG: 40 TABLET, DELAYED RELEASE ORAL at 13:20

## 2024-11-26 RX ADMIN — LISINOPRIL 20 MG: 20 TABLET ORAL at 13:20

## 2024-11-26 RX ADMIN — MAGNESIUM SULFATE HEPTAHYDRATE 2000 MG: 40 INJECTION, SOLUTION INTRAVENOUS at 07:57

## 2024-11-26 RX ADMIN — FENTANYL CITRATE 100 MCG: 50 INJECTION, SOLUTION INTRAMUSCULAR; INTRAVENOUS at 07:35

## 2024-11-26 RX ADMIN — SODIUM CHLORIDE: 9 INJECTION, SOLUTION INTRAVENOUS at 19:16

## 2024-11-26 RX ADMIN — Medication 200 MCG: at 10:06

## 2024-11-26 RX ADMIN — ROCURONIUM BROMIDE 20 MG: 10 INJECTION, SOLUTION INTRAVENOUS at 08:37

## 2024-11-26 RX ADMIN — ACETAMINOPHEN 650 MG: 650 SOLUTION ORAL at 07:12

## 2024-11-26 RX ADMIN — Medication 200 MCG: at 10:04

## 2024-11-26 RX ADMIN — ACETAMINOPHEN 650 MG: 325 TABLET ORAL at 13:30

## 2024-11-26 RX ADMIN — HYDROMORPHONE HYDROCHLORIDE 0.5 MG: 1 INJECTION, SOLUTION INTRAMUSCULAR; INTRAVENOUS; SUBCUTANEOUS at 10:44

## 2024-11-26 RX ADMIN — PROPOFOL 30 MG: 10 INJECTION, EMULSION INTRAVENOUS at 10:49

## 2024-11-26 RX ADMIN — LIDOCAINE HYDROCHLORIDE 1.5 MG/KG/HR: 4 INJECTION, SOLUTION INTRAVENOUS at 07:40

## 2024-11-26 RX ADMIN — PHENYLEPHRINE HYDROCHLORIDE 40 MCG/MIN: 10 INJECTION INTRAVENOUS at 07:50

## 2024-11-26 RX ADMIN — PROPOFOL 200 MG: 10 INJECTION, EMULSION INTRAVENOUS at 07:35

## 2024-11-26 RX ADMIN — Medication 120 MCG: at 07:43

## 2024-11-26 RX ADMIN — ROCURONIUM BROMIDE 20 MG: 10 INJECTION, SOLUTION INTRAVENOUS at 09:18

## 2024-11-26 RX ADMIN — ROCURONIUM BROMIDE 35 MG: 10 INJECTION, SOLUTION INTRAVENOUS at 07:45

## 2024-11-26 RX ADMIN — PREGABALIN 100 MG: 25 CAPSULE ORAL at 13:20

## 2024-11-26 RX ADMIN — Medication 50 MG: at 07:40

## 2024-11-26 RX ADMIN — DEXAMETHASONE SODIUM PHOSPHATE 8 MG: 4 INJECTION, SOLUTION INTRAMUSCULAR; INTRAVENOUS at 07:52

## 2024-11-26 RX ADMIN — EPHEDRINE SULFATE 5 MG: 50 INJECTION INTRAVENOUS at 09:21

## 2024-11-26 RX ADMIN — LIDOCAINE HYDROCHLORIDE 100 MG: 20 INJECTION, SOLUTION EPIDURAL; INFILTRATION; INTRACAUDAL; PERINEURAL at 07:35

## 2024-11-26 RX ADMIN — ROCURONIUM BROMIDE 5 MG: 10 INJECTION, SOLUTION INTRAVENOUS at 07:35

## 2024-11-26 RX ADMIN — MIDAZOLAM HYDROCHLORIDE 2 MG: 1 INJECTION, SOLUTION INTRAMUSCULAR; INTRAVENOUS at 07:30

## 2024-11-26 RX ADMIN — WATER 2000 MG: 1 INJECTION INTRAMUSCULAR; INTRAVENOUS; SUBCUTANEOUS at 07:46

## 2024-11-26 RX ADMIN — SODIUM CHLORIDE, POTASSIUM CHLORIDE, SODIUM LACTATE AND CALCIUM CHLORIDE: 600; 310; 30; 20 INJECTION, SOLUTION INTRAVENOUS at 07:29

## 2024-11-26 RX ADMIN — EPHEDRINE SULFATE 10 MG: 50 INJECTION INTRAVENOUS at 07:43

## 2024-11-26 RX ADMIN — ROCURONIUM BROMIDE 10 MG: 10 INJECTION, SOLUTION INTRAVENOUS at 10:25

## 2024-11-26 RX ADMIN — ROCURONIUM BROMIDE 10 MG: 10 INJECTION, SOLUTION INTRAVENOUS at 08:12

## 2024-11-26 RX ADMIN — METRONIDAZOLE 500 MG: 5 INJECTION, SOLUTION INTRAVENOUS at 07:51

## 2024-11-26 ASSESSMENT — LIFESTYLE VARIABLES: SMOKING_STATUS: 1

## 2024-11-26 ASSESSMENT — PAIN DESCRIPTION - ORIENTATION: ORIENTATION: RIGHT;LEFT;ANTERIOR;MID;LOWER

## 2024-11-26 ASSESSMENT — PAIN DESCRIPTION - DESCRIPTORS: DESCRIPTORS: ACHING;SORE;CRAMPING;TENDER

## 2024-11-26 ASSESSMENT — PAIN SCALES - GENERAL
PAINLEVEL_OUTOF10: 0
PAINLEVEL_OUTOF10: 2
PAINLEVEL_OUTOF10: 2

## 2024-11-26 ASSESSMENT — PAIN - FUNCTIONAL ASSESSMENT: PAIN_FUNCTIONAL_ASSESSMENT: 0-10

## 2024-11-26 ASSESSMENT — PAIN DESCRIPTION - LOCATION: LOCATION: ABDOMEN

## 2024-11-26 NOTE — ANESTHESIA POSTPROCEDURE EVALUATION
Department of Anesthesiology  Postprocedure Note    Patient: Jennifer Hooper  MRN: [id number]  YOB: 1970  Date of evaluation: 11/26/2024    Procedure Summary       Date: 11/26/24 Room / Location: [facility] MAIN OR [address] [place] [address] MAIN OR    Anesthesia Start: 0726 Anesthesia Stop: 1106    Procedure: LAPAROSCOPIC GASTRIC BYPASS WITH PARAESOPHAGEAL HERNIA REPAIR WITH MESH, ESOPHAGOGASTRODUODENOSCOPY (EGD) (E R A S) (Abdomen) Diagnosis:       Morbid obesity      Esophageal reflux      Mild chronic ulcerative colitis (HCC)      (Morbid obesity [E66.01])      (Esophageal reflux [K21.9])      (Mild chronic ulcerative colitis (HCC) [K51.90])    Providers: Isaac Schulte MD Responsible Provider: Humble Varma DO    Anesthesia Type: general ASA Status: 3            Anesthesia Type: No value filed.    Ciara Phase I:      Ciara Phase II:      Anesthesia Post Evaluation    Patient location during evaluation: PACU  Patient participation: complete - patient participated  Level of consciousness: awake  Pain score: 0  Airway patency: patent  Nausea & Vomiting: no nausea  Cardiovascular status: hemodynamically stable  Respiratory status: acceptable  Hydration status: euvolemic  Comments: Seen, no complaints   Pain management: adequate    No notable events documented.

## 2024-11-26 NOTE — FLOWSHEET NOTE
11/26/24 0823   Family Communication    Relationship to Patient Spouse    Phone Number any Hooper [phone]   Family/Significant Other Update Called;Updated   Delivery Origin Nurse   Message Disposition Family present - message delivered   Update Given Yes   Family Communication   Family Update Message Procedure started;Surgeon working;Patient stable

## 2024-11-26 NOTE — FLOWSHEET NOTE
11/26/24 1019   Family Communication    Relationship to Patient Spouse   Family/Significant Other Update Called   Delivery Origin Nurse   Message Disposition Family present - message delivered   Update Given Yes   Family Communication   Family Update Message Surgeon working;Patient stable

## 2024-11-26 NOTE — H&P
[facility] History and Physical     History of Present Illness:      Jennifer Hooper is a 54 y.o. female who has been preparing for laparoscopic Bruinlda-en-Y gastric bypass and paraesophageal hernia repair.  She has been cleared for surgery.  She has a history of ulcerative colitis but has not had any issues and not needing any maintenance medications and has been cleared by her GI specialist and had a colonoscopy.     Past Medical History        Past Medical History:   Diagnosis Date    Anemia      Anxiety 2022    Arthritis       KNEES    Chronic back pain 2021    Depression 2022    GERD (gastroesophageal reflux disease)      Hyperlipidemia      Hypertension 2013    Kidney stones      Melanoma of scalp (HCC)      Morbid obesity with BMI of 40.0-44.9, adult      Multiple personality disorder (HCC)      Ulcerative colitis (HCC)              Past Surgical History         Past Surgical History:   Procedure Laterality Date    HYSTERECTOMY, TOTAL ABDOMINAL (CERVIX REMOVED)   2012 December    UPPER GASTROINTESTINAL ENDOSCOPY   2010              Current Medication      Current Outpatient Medications:     polyethylene glycol (GLYCOLAX) 17 GM/SCOOP powder, Take 17 g by mouth daily, Disp: 1530 g, Rfl: 0    ondansetron (ZOFRAN) 4 MG tablet, Take 1 tablet by mouth every 8 hours as needed for Nausea or Vomiting, Disp: 30 tablet, Rfl: 0    omeprazole (PRILOSEC) 40 MG delayed release capsule, Take 1 capsule by mouth every morning (before breakfast), Disp: 90 capsule, Rfl: 1    CALCIUM PO, Take 600 mg by mouth 2 times daily, Disp: , Rfl:     pregabalin (LYRICA) 100 MG capsule, Take 1 capsule by mouth 3 times daily., Disp: , Rfl:     atorvastatin (LIPITOR) 20 MG tablet, Take 1 tablet by mouth every morning, Disp: , Rfl:     ezetimibe (ZETIA) 10 MG tablet, Take 1 tablet by mouth every morning (before breakfast), Disp: , Rfl:     lisinopril-hydroCHLOROthiazide (PRINZIDE;ZESTORETIC) 20-12.5

## 2024-11-26 NOTE — BRIEF OP NOTE
Brief Postoperative Note      Patient: Jennifer Hooper  YOB: 1970  MRN: [id number]    Date of Procedure: 11/26/2024    Pre-Op Diagnosis Codes:      * Morbid obesity [E66.01]     * Esophageal reflux [K21.9]     * Mild chronic ulcerative colitis (HCC) [K51.90]    Post-Op Diagnosis: Same       Procedure(s):  LAPAROSCOPIC GASTRIC BYPASS WITH PARAESOPHAGEAL HERNIA REPAIR WITH MESH, ESOPHAGOGASTRODUODENOSCOPY (EGD) (E R A S)    Surgeon(s):  Isaac Schulte MD    Assistant:  Physician Assistant: Judy Tay PA    Anesthesia: General    Estimated Blood Loss (mL): Minimal    Complications: None    Specimens:   ID Type Source Tests Collected by Time Destination   1 :  Tissue Tissue SURGICAL PATHOLOGY Isaac Schulte MD 11/26/2024 1000        Implants:  Implant Name Type Inv. Item Serial No.  Lot No. LRB No. Used Action   MESH JEANNE [id number] SYN TISS REINF BIOABSRB DISP BIO-A - [id number] Mesh MESH JEANNE [id number] SYN TISS REINF BIOABSRB DISP BIO-A [id number]  [employer]- NA N/A 1 Implanted         Drains: * No LDAs found *    Findings:  Infection Present At Time Of Surgery (PATOS) (choose all levels that have infection present):  No infection present  Other Findings: small/med paraesophageal hernia 3cm, repaired primarily and with 7x10cm BIO-A mesh.  Normal gastric bypass 150cm Brunilda limb, 50cm BP limb, antecolic antegastric bypass, normal post op EGD, negative leak test    Electronically signed by Isaac Schulte MD on 11/26/2024 at 10:46 AM

## 2024-11-26 NOTE — PROGRESS NOTES
Report given to ADRIANA Srivastava on 5E. VSS. Pt transferred via bed to room 519. Notified surgical waiting.

## 2024-11-26 NOTE — ANESTHESIA PRE PROCEDURE
from Last 3 Encounters:   11/26/24 110.8 kg (244 lb 4.3 oz)   11/20/24 108.4 kg (239 lb)   10/25/24 112.8 kg (248 lb 10.9 oz)     Body mass index is 41.93 kg/m².    CBC:   Lab Results   Component Value Date/Time    WBC 6.1 10/25/2024 09:34 AM    RBC 5.26 10/25/2024 09:34 AM    HGB 13.1 10/25/2024 09:34 AM    HCT 40.9 10/25/2024 09:34 AM    MCV 77.8 10/25/2024 09:34 AM    RDW 14.6 10/25/2024 09:34 AM     10/25/2024 09:34 AM       CMP:   Lab Results   Component Value Date/Time     10/25/2024 09:34 AM    K 3.8 10/25/2024 09:34 AM     10/25/2024 09:34 AM    CO2 28 10/25/2024 09:34 AM    BUN 17 10/25/2024 09:34 AM    CREATININE 0.72 10/25/2024 09:34 AM    LABGLOM >90 10/25/2024 09:34 AM    GLUCOSE 123 10/25/2024 09:34 AM    GLUCOSE 90 08/08/2024 09:33 AM    CALCIUM 10.0 10/25/2024 09:34 AM    BILITOT 0.3 10/25/2024 09:34 AM    ALKPHOS 98 10/25/2024 09:34 AM    ALKPHOS 98 08/08/2024 09:33 AM    AST 6 10/25/2024 09:34 AM    ALT 23 10/25/2024 09:34 AM       POC Tests: No results for input(s): \"POCGLU\", \"POCNA\", \"POCK\", \"POCCL\", \"POCBUN\", \"POCHEMO\", \"POCHCT\" in the last 72 hours.    Coags: No results found for: \"PROTIME\", \"INR\", \"APTT\"    HCG (If Applicable): No results found for: \"PREGTESTUR\", \"PREGSERUM\", \"HCG\", \"HCGQUANT\"     ABGs: No results found for: \"PHART\", \"PO2ART\", \"IHD2BQW\", \"COK7CBX\", \"BEART\", \"Q6FNYIDO\"     Type & Screen (If Applicable):  No results found for: \"ABORH\", \"LABANTI\"    Drug/Infectious Status (If Applicable):  No results found for: \"HIV\", \"HEPCAB\"    COVID-19 Screening (If Applicable): No results found for: \"COVID19\"        Anesthesia Evaluation  Patient summary reviewed and Nursing notes reviewed   no history of anesthetic complications:   Airway: Mallampati: III  TM distance: >3 FB   Neck ROM: full  Mouth opening: > = 3 FB   Dental: normal exam         Pulmonary: breath sounds clear to auscultation  (+)           current smoker          Patient did not smoke on day of

## 2024-11-27 VITALS
SYSTOLIC BLOOD PRESSURE: 116 MMHG | OXYGEN SATURATION: 93 % | TEMPERATURE: 98.4 F | BODY MASS INDEX: 41.93 KG/M2 | HEART RATE: 64 BPM | RESPIRATION RATE: 18 BRPM | WEIGHT: 244.27 LBS | DIASTOLIC BLOOD PRESSURE: 76 MMHG

## 2024-11-27 LAB
ANION GAP SERPL CALC-SCNC: 4 MMOL/L (ref 2–12)
BUN SERPL-MCNC: 7 MG/DL (ref 6–20)
BUN/CREAT SERPL: 13 (ref 12–20)
CALCIUM SERPL-MCNC: 10.2 MG/DL (ref 8.5–10.1)
CHLORIDE SERPL-SCNC: 113 MMOL/L (ref 97–108)
CO2 SERPL-SCNC: 25 MMOL/L (ref 21–32)
COMMENT:: NORMAL
CREAT SERPL-MCNC: 0.56 MG/DL (ref 0.55–1.02)
ERYTHROCYTE [DISTWIDTH] IN BLOOD BY AUTOMATED COUNT: 14.9 % (ref 11.5–14.5)
GLUCOSE SERPL-MCNC: 102 MG/DL (ref 65–100)
HCT VFR BLD AUTO: 38.4 % (ref 35–47)
HGB BLD-MCNC: 12.5 G/DL (ref 11.5–16)
MAGNESIUM SERPL-MCNC: 2.3 MG/DL (ref 1.6–2.4)
MCH RBC QN AUTO: 25.7 PG (ref 26–34)
MCHC RBC AUTO-ENTMCNC: 32.6 G/DL (ref 30–36.5)
MCV RBC AUTO: 78.9 FL (ref 80–99)
NRBC # BLD: 0 K/UL (ref 0–0.01)
NRBC BLD-RTO: 0 PER 100 WBC
PHOSPHATE SERPL-MCNC: 1.8 MG/DL (ref 2.6–4.7)
PLATELET # BLD AUTO: 170 K/UL (ref 150–400)
PMV BLD AUTO: 12.2 FL (ref 8.9–12.9)
POTASSIUM SERPL-SCNC: 4.2 MMOL/L (ref 3.5–5.1)
RBC # BLD AUTO: 4.87 M/UL (ref 3.8–5.2)
SODIUM SERPL-SCNC: 142 MMOL/L (ref 136–145)
SPECIMEN HOLD: NORMAL
WBC # BLD AUTO: 10.2 K/UL (ref 3.6–11)

## 2024-11-27 PROCEDURE — 96374 THER/PROPH/DIAG INJ IV PUSH: CPT

## 2024-11-27 PROCEDURE — 85027 COMPLETE CBC AUTOMATED: CPT

## 2024-11-27 PROCEDURE — 96376 TX/PRO/DX INJ SAME DRUG ADON: CPT

## 2024-11-27 PROCEDURE — 96375 TX/PRO/DX INJ NEW DRUG ADDON: CPT

## 2024-11-27 PROCEDURE — 83735 ASSAY OF MAGNESIUM: CPT

## 2024-11-27 PROCEDURE — 6360000002 HC RX W HCPCS: Performed by: SURGERY

## 2024-11-27 PROCEDURE — 96372 THER/PROPH/DIAG INJ SC/IM: CPT

## 2024-11-27 PROCEDURE — 80048 BASIC METABOLIC PNL TOTAL CA: CPT

## 2024-11-27 PROCEDURE — 84100 ASSAY OF PHOSPHORUS: CPT

## 2024-11-27 PROCEDURE — 6370000000 HC RX 637 (ALT 250 FOR IP): Performed by: SURGERY

## 2024-11-27 PROCEDURE — G0378 HOSPITAL OBSERVATION PER HR: HCPCS

## 2024-11-27 RX ORDER — OXYCODONE HYDROCHLORIDE 5 MG/1
5 TABLET ORAL EVERY 4 HOURS PRN
Status: DISCONTINUED | OUTPATIENT
Start: 2024-11-27 | End: 2024-11-27 | Stop reason: HOSPADM

## 2024-11-27 RX ORDER — KETOROLAC TROMETHAMINE 30 MG/ML
15 INJECTION, SOLUTION INTRAMUSCULAR; INTRAVENOUS EVERY 6 HOURS
Status: DISCONTINUED | OUTPATIENT
Start: 2024-11-27 | End: 2024-11-27 | Stop reason: HOSPADM

## 2024-11-27 RX ORDER — OXYCODONE AND ACETAMINOPHEN 5; 325 MG/1; MG/1
1 TABLET ORAL EVERY 6 HOURS PRN
Qty: 20 TABLET | Refills: 0 | Status: SHIPPED | OUTPATIENT
Start: 2024-11-27 | End: 2024-12-04

## 2024-11-27 RX ORDER — ENOXAPARIN SODIUM 100 MG/ML
40 INJECTION SUBCUTANEOUS DAILY
Status: DISCONTINUED | OUTPATIENT
Start: 2024-11-27 | End: 2024-11-27 | Stop reason: HOSPADM

## 2024-11-27 RX ADMIN — PANTOPRAZOLE SODIUM 40 MG: 40 TABLET, DELAYED RELEASE ORAL at 08:56

## 2024-11-27 RX ADMIN — ENOXAPARIN SODIUM 40 MG: 100 INJECTION SUBCUTANEOUS at 08:57

## 2024-11-27 RX ADMIN — ACETAMINOPHEN 650 MG: 325 TABLET ORAL at 08:58

## 2024-11-27 RX ADMIN — CITALOPRAM HYDROBROMIDE 40 MG: 20 TABLET ORAL at 08:56

## 2024-11-27 RX ADMIN — KETOROLAC TROMETHAMINE 15 MG: 30 INJECTION, SOLUTION INTRAMUSCULAR at 08:56

## 2024-11-27 RX ADMIN — LISINOPRIL 20 MG: 20 TABLET ORAL at 08:56

## 2024-11-27 RX ADMIN — ACETAMINOPHEN 650 MG: 325 TABLET ORAL at 13:59

## 2024-11-27 RX ADMIN — AMLODIPINE BESYLATE 10 MG: 5 TABLET ORAL at 08:56

## 2024-11-27 RX ADMIN — PREGABALIN 100 MG: 25 CAPSULE ORAL at 08:55

## 2024-11-27 RX ADMIN — HYDROMORPHONE HYDROCHLORIDE 1 MG: 1 INJECTION, SOLUTION INTRAMUSCULAR; INTRAVENOUS; SUBCUTANEOUS at 06:28

## 2024-11-27 RX ADMIN — KETOROLAC TROMETHAMINE 15 MG: 30 INJECTION, SOLUTION INTRAMUSCULAR at 14:00

## 2024-11-27 ASSESSMENT — PAIN DESCRIPTION - LOCATION
LOCATION: ABDOMEN

## 2024-11-27 ASSESSMENT — PAIN DESCRIPTION - DESCRIPTORS
DESCRIPTORS: ACHING;SORE
DESCRIPTORS: ACHING
DESCRIPTORS: ACHING

## 2024-11-27 ASSESSMENT — PAIN DESCRIPTION - ORIENTATION
ORIENTATION: MID
ORIENTATION: ANTERIOR
ORIENTATION: ANTERIOR

## 2024-11-27 ASSESSMENT — PAIN SCALES - GENERAL
PAINLEVEL_OUTOF10: 5
PAINLEVEL_OUTOF10: 5
PAINLEVEL_OUTOF10: 1

## 2024-11-27 ASSESSMENT — PAIN DESCRIPTION - PAIN TYPE: TYPE: SURGICAL PAIN

## 2024-11-27 NOTE — DISCHARGE SUMMARY
Discharge Summary    Date: 11/27/2024  Patient Name: Jennifer Hooper    YOB: 1970     Age: 54 y.o.    Admit Date: 11/26/2024  Discharge Date: 11/27/2024  Discharge Condition: Good    Admission Diagnosis  Morbid obesity [E66.01];Esophageal reflux [K21.9];Mild chronic ulcerative colitis (HCC) [K51.90];Morbid obesity with BMI of 40.0-44.9, adult [E66.01, Z68.41]      Discharge Diagnosis  Principal Problem:    Morbid obesity with BMI of 40.0-44.9, adult  Active Problems:    Morbid obesity    Esophageal reflux    Mild chronic ulcerative colitis (HCC)    Paraesophageal hernia  Resolved Problems:    * No resolved hospital problems. *      Hospital Stay  Narrative of Hospital Course:  Pt was admitted post op and started clears and was walking good.  She was started on full liquid POD 1am and was drinking well by the pm and was DC home.    Consultants:  IP CONSULT TO DIETITIAN    Surgeries/procedures Performed:      Treatments:    Surgery        Discharge Plan/Disposition:  Home    Hospital/Incidental Findings Requiring Follow Up:    Patient Instructions:    Diet:    Activity:No Heavy Lifting  For number of days (if applicable):      Other Instructions: Bariatric full liquid diet    Provider Follow-Up:   No follow-ups on file.     Significant Diagnostic Studies:    Recent Labs:  Admission on 11/26/2024  Sodium                                        Date: 11/27/2024  Value: 142         Ref range: 136 - 145 mmol/L   Status: Final  Potassium                                     Date: 11/27/2024  Value: 4.2         Ref range: 3.5 - 5.1 mmol/L   Status: Final  Chloride                                      Date: 11/27/2024  Value: 113 (H)     Ref range: 97 - 108 mmol/L    Status: Final  CO2                                           Date: 11/27/2024  Value: 25          Ref range: 21 - 32 mmol/L     Status: Final  Anion Gap                                     Date: 11/27/2024  Value: 4           Ref range: 2 - 12

## 2024-11-27 NOTE — DISCHARGE INSTRUCTIONS
containing Iron - 2 multivitamins with 100% Daily Value of Iron, Folic Acid and Thiamine   Vitamin D-3 - Take 3000 IU  per day  Vitamin B-12 - Oral or Sublingual: 350-500 mcg/day OR 1000 mcg Monthly intramuscular shot        ACTIVITY    Be active.  Sit up as much as possible.  Walk often.  Walking and/or foot exercises will help prevent blood clots.  Continue to sip liquids throughout the day  Continue to use your incentive spirometer 4 to 5 times per day  Keep your incisions clean and dry to prevent infection.    Showering is ok.  No submersion in water for 2 weeks (No tubs, pools, etc.)  Weight lifting restrictions:  10 lbs. for the first 2 weeks, 20 lbs. for the next 4 to 6 weeks    TOP REASONS TO CONTACT YOUR SURGEONS'S OFFICE    You have severe pain or discomfort unrelieved by pain medication.  You have been vomiting for more than 24 hours.  Call sooner if you are unable to drink any fluids.  Temperature rises above 101 degrees.  You have persistent nausea and/or vomiting.  You are unable to swallow liquids   Increased swelling, redness, or drainage from your incision sites.

## 2024-11-27 NOTE — PROGRESS NOTES
[facility]    POD #1    Subjective     Doing well today, minimal pain, up and walking very well, no nausea vomiting, drinking well    Objective     Patient Vitals for the past 24 hrs:   Temp Pulse Resp BP SpO2   11/27/24 0804 98.4 °F (36.9 °C) 79 18 116/76 93 %   11/27/24 0628 -- -- -- -- 96 %   11/27/24 0600 -- 77 -- -- --   11/27/24 0359 -- 90 -- -- --   11/27/24 0328 98.7 °F (37.1 °C) 89 18 115/76 90 %   11/27/24 0147 -- 98 -- -- --   11/26/24 2348 98.2 °F (36.8 °C) 93 17 108/70 93 %   11/26/24 2151 -- 94 -- -- --   11/26/24 2000 -- (!) 105 -- -- --   11/26/24 1958 98.2 °F (36.8 °C) 79 19 117/81 97 %   11/26/24 1807 -- (!) 113 -- -- --   11/26/24 1730 97.9 °F (36.6 °C) (!) 102 18 119/70 92 %   11/26/24 1408 -- 94 -- -- --   11/26/24 1256 98.1 °F (36.7 °C) 92 16 116/72 94 %   11/26/24 1230 -- 93 11 121/71 --   11/26/24 1215 98 °F (36.7 °C) 96 13 124/88 --   11/26/24 1200 -- 95 12 (!) 109/55 91 %   11/26/24 1145 -- 97 14 122/72 93 %   11/26/24 1130 -- 97 15 106/69 91 %   11/26/24 1120 -- 97 15 114/69 91 %   11/26/24 1115 -- 98 15 111/72 91 %   11/26/24 1110 -- 96 16 108/67 91 %   11/26/24 1105 -- 97 15 118/68 92 %   11/26/24 1103 98 °F (36.7 °C) 97 14 116/69 92 %         Intake/Output Summary (Last 24 hours) at 11/27/2024 0807  Last data filed at 11/27/2024 0630  Gross per 24 hour   Intake 1230 ml   Output 5200 ml   Net -3970 ml        PE  Pulm - CTAB  CV - RRR  Abd -soft, nondistended, bowel sounds present, incisions clean dry and intact    Labs  Lab Results   Component Value Date/Time     11/27/2024 05:46 AM    K 4.2 11/27/2024 05:46 AM     11/27/2024 05:46 AM    CO2 25 11/27/2024 05:46 AM    BUN 7 11/27/2024 05:46 AM    CREATININE 0.56 11/27/2024 05:46 AM    GLUCOSE 102 11/27/2024 05:46 AM    GLUCOSE 90 08/08/2024 09:33 AM    CALCIUM 10.2 11/27/2024 05:46 AM    LABGLOM >90 11/27/2024 05:46 AM      Lab Results   Component Value Date    WBC 10.2 11/27/2024

## 2024-11-27 NOTE — CONSULTS
Nutrition Education    Educated on Bariatric post-op diet  Learners: Patient  Readiness: Eager  Method: Explanation  Response: Verbalizes Understanding  Contact name and number provided.    Modesto Plummer RD  Contact via [employer]

## 2024-11-27 NOTE — OP NOTE
mesenteric defect with a running 2-0 nonabsorbable V-Loc suture.  The JJ anastomosis was complete.  We would then need to take off the distal portion of the Brunilda limb taking off about 2 cm of the Brunilda limb that was mildly ischemic.  The rest of the Brunilda limb looked completely normal.  We divided that area with a tan load 60 mm Signia stapler, removed that from an EndoCatch bag and passed it off the field.  We then would bring the Brunilda limb antecolic over the colon making sure it was untwisted and then lining up our GJ anastomosis to the gastric pouch with a 3-0 absorbable V-Loc suture lining that up on the first vertical fire, which was our second fire.  We lined up our GJ anastomosis.  Everything lined up.  There was no tension.  We then made a small gastrotomy and a small enterotomy in the medial portion of the pouch and Brunilda limb.  I then made anastomosis between the 2 with a tan load 45 mm Signia stapler stapling it just past 35 mm.  We then closed the common enterotomy between the 2 with a running 3-0 absorbable V-Loc suture in a double-layered fashion.  Common enterotomy was closed.  We then did a hitch stitch of the Brunilda limb to the antrum of the stomach for any twisting of the Brunilda limb.  The anastomosis looked great.  We then did our endoscopy.  I placed the endoscope down the mouth to the esophagus and down into the gastric pouch.  The gastric pouch looked good.  There was no leak from our leak test.  Our anastomosis was widely patent.  We could scope down into the Brunilda limb without any difficulty.  There was no twisting, bending, or angulation.  Everything was hemostatic.  Leak test was negative.  No bubbles were seen.  We then desufflated the Brunilda limb and the gastric pouch, desufflating and removing the scope, passing it off the field and then we closed our retro Brunilda Jimenez space defect with a running 2-0 nonabsorbable V-Loc suture in a running fashion.  Suture was removed.  All of our counts were

## 2024-11-29 ENCOUNTER — TELEPHONE (OUTPATIENT)
Age: 54
End: 2024-11-29

## 2024-11-29 NOTE — TELEPHONE ENCOUNTER
Bariatric Post Op Call 48 hour    Hydration: Less than 32 ounces of water daily is fair to poor (Goal is 64 ounces per day)  Poor [] Fair []  Good [] Great []    Amount: 60 ounces yesterday, 32 ounces     Comment: Encouraged to keep up the good work.     Ambulation:( walking throughout the day, at least every 2 hours while awake. Patient should be up and out of bed most of the day.)   Poor [] Fair []  Good [] Great [x]    Comment:    Urine Color: Question of any odor and color (should be maricruz, pale, and clear)   Dark [] Maricruz [] Pale [x] Clear []    Comment:No odor     Diet: Question any nausea and/or vomiting.            Protein intake (ultimately goal is 60 grams of protein daily, but at 2 days post op they should be working towards this and may not be at goal yet)  Poor [] Fair []  Good [] Great [x]    Comment: Nausea noted yesterday, Zofran taken which helped. Feels she may have consumed to much to fast. Reported drinking 2 shakes yesterday.       Bowel movements: Question of any constipation- haven't had any bowel movements for more than 3 days. This could be related to protein intake and/or narcotic pain medication usage.     Comment: BM reported today. Taking Miralax daily. I told her in no BM in a couple of days she can increase the Miralax to twice a day and then if no BM add a dose of MOM.          Use of incentive spirometer:  Yes [x]  No []    Comment: Reported    Incision: (No redness, pain, swelling or fever)     Healing Well [x] Redness [] Pain [] Drainage [] Swelling []    Comment: No Fever    Pain: Right sided incisional (usually the largest incision with deep stitch) abdominal pain is normal (should be less than 3).  Pain 0 - 10: 2    Comment (are they taking pain medication and is it helping? Abdominal support / splinting/ ice or heat?): Taking Percocet as needed. I told her she can use heat or ice for pain. You can also use a pillow to splint the abdomen when changing positions.     Referred

## 2024-12-10 ENCOUNTER — OFFICE VISIT (OUTPATIENT)
Age: 54
End: 2024-12-10

## 2024-12-10 VITALS
OXYGEN SATURATION: 96 % | HEART RATE: 71 BPM | SYSTOLIC BLOOD PRESSURE: 114 MMHG | HEIGHT: 64 IN | BODY MASS INDEX: 38.58 KG/M2 | DIASTOLIC BLOOD PRESSURE: 80 MMHG | RESPIRATION RATE: 15 BRPM | WEIGHT: 226 LBS | TEMPERATURE: 98 F

## 2024-12-10 DIAGNOSIS — Z09 SURGICAL FOLLOW-UP CARE: ICD-10-CM

## 2024-12-10 DIAGNOSIS — E66.01 MORBID OBESITY: Primary | ICD-10-CM

## 2024-12-10 PROCEDURE — 99024 POSTOP FOLLOW-UP VISIT: CPT | Performed by: NURSE PRACTITIONER

## 2024-12-10 ASSESSMENT — PATIENT HEALTH QUESTIONNAIRE - PHQ9
SUM OF ALL RESPONSES TO PHQ9 QUESTIONS 1 & 2: 0
4. FEELING TIRED OR HAVING LITTLE ENERGY: NOT AT ALL
10. IF YOU CHECKED OFF ANY PROBLEMS, HOW DIFFICULT HAVE THESE PROBLEMS MADE IT FOR YOU TO DO YOUR WORK, TAKE CARE OF THINGS AT HOME, OR GET ALONG WITH OTHER PEOPLE: NOT DIFFICULT AT ALL
6. FEELING BAD ABOUT YOURSELF - OR THAT YOU ARE A FAILURE OR HAVE LET YOURSELF OR YOUR FAMILY DOWN: NOT AT ALL
1. LITTLE INTEREST OR PLEASURE IN DOING THINGS: NOT AT ALL
SUM OF ALL RESPONSES TO PHQ QUESTIONS 1-9: 0
2. FEELING DOWN, DEPRESSED OR HOPELESS: NOT AT ALL
5. POOR APPETITE OR OVEREATING: NOT AT ALL
SUM OF ALL RESPONSES TO PHQ QUESTIONS 1-9: 0
9. THOUGHTS THAT YOU WOULD BE BETTER OFF DEAD, OR OF HURTING YOURSELF: NOT AT ALL
7. TROUBLE CONCENTRATING ON THINGS, SUCH AS READING THE NEWSPAPER OR WATCHING TELEVISION: NOT AT ALL
SUM OF ALL RESPONSES TO PHQ QUESTIONS 1-9: 0
3. TROUBLE FALLING OR STAYING ASLEEP: NOT AT ALL
SUM OF ALL RESPONSES TO PHQ QUESTIONS 1-9: 0
8. MOVING OR SPEAKING SO SLOWLY THAT OTHER PEOPLE COULD HAVE NOTICED. OR THE OPPOSITE, BEING SO FIGETY OR RESTLESS THAT YOU HAVE BEEN MOVING AROUND A LOT MORE THAN USUAL: NOT AT ALL

## 2024-12-10 NOTE — PATIENT INSTRUCTIONS
What you need to know:  1.   Advance your diet to soft foods.  Follow the handout that you were given today in the office.   2.  Take the recommended vitamins daily  3.  No lifting greater than 20 lbs.   4.  You can do light jogging and walking.   5  Follow up in 2 weeks.  6.  You may go into a pool.   7.  If you are not able to tolerate liquids or soft foods.   Please call our office. [phone]  8.  If you have vomiting and persistent epigastric pain or chest pain. You should call our office, the doctor on-call or go to the emergency room.     Constipation  Benefiber, Miralax & similar (once or twice daily)  Milk of Magnesia (daily as needed)  Dulcolax suppository  Fleets Enema    Soft and Mushy   What is this diet?  Introduces soft, easy to digest foods  Low fat, no sugar added    When do I begin?  Once instructed by your surgeon or NP. Usually 2 -3 weeks after surgery      You will stay on this diet until instructed to start the next phase.     What foods can I eat?  Moist, mushy foods (see approved list of foods)       Key Points  Continue to drink 48-64 ounces of low calorie, non-carbonated, sugar free beverages between meals.   Eat 3 meals per day  Measure each meal to ?cup per meal  Aim for 60 grams of protein every day.  Try food sources of protein first.   Continue to supplement with protein shakes/powder to meet protein goals.  Take small bites.  Try eating with smaller utensils (baby spoon, cocktail fork).  Chew food thoroughly  Allow about 30 minutes to eat a meal.  Eating too fast may cause nausea or vomiting.  Stop eating as soon as you feel full. Overeating may stretch your stomach's capacity and prevent desired weight loss.  Do not drink liquids during meals and 30 minutes after meals.  Drinking with meals may cause nausea or vomiting.  Add one new food at a time  Take vitamins daily                     Shopping Lists    Soft and Mushy  In addition to everything on the Bariatric Liquid diet, you may

## 2024-12-10 NOTE — PROGRESS NOTES
Identified patient with two patient identifiers (name and ). Reviewed chart in preparation for visit and have obtained necessary documentation.    Jennifer Hooper is a 54 y.o. female  Chief Complaint   Patient presents with    Post-Op Check     2 week s/p LAPAROSCOIPIC GASTRIC BYPASS WITH PARAESOPHAGEAL HERNIA REPAIR, EGD *ERAS*     /80   Pulse 71   Temp 98 °F (36.7 °C) (Oral)   Resp 15   Wt 102.5 kg (226 lb)   LMP  (LMP Unknown)   SpO2 96%   BMI 38.79 kg/m²     1. Have you been to the ER, urgent care clinic since your last visit?  Hospitalized since your last visit?no    2. Have you seen or consulted any other health care providers outside of the [facility] since your last visit?  Include any pap smears or colon screening. no

## 2024-12-10 NOTE — PROGRESS NOTES
2 weeks status post paraesophageal hernia repair with mesh and gastric bypass.    Pt reports doing well on liquids .    Has some soreness on the left for one day. Feeling better.   Pt reports no nausea and no vomiting  She is drinking approximately 64 oz of water daily  + BM  She is drinking and eating 60 grams of protein daily.     She is taking bariatric vitamins without issue.     Total weight loss since surgery 22lbs  Weight loss since last visit 22lbs  /80 (Site: Left Upper Arm, Position: Sitting, Cuff Size: Large Adult)   Pulse 71   Temp 98 °F (36.7 °C) (Oral)   Resp 15   Ht 1.626 m (5' 4\")   Wt 102.5 kg (226 lb)   LMP  (LMP Unknown)   SpO2 96%   BMI 38.79 kg/m²            Ms. Hooper has a reminder for a \"due or due soon\" health maintenance. I have asked that she contact her primary care provider for follow-up on this health maintenance.      Physical Examination: General appearance - alert, well appearing, and in no distress,  Chest - clear to auscultation bilaterally  Heart - normal rate, regular rhythm, normal S1, S2, no murmurs, rubs, clicks or gallops  Abdomen - soft, nontender, nondistended  scars from previous incisions healing without erythema or induration    A/P    Doing well 2 weeks status post paraesophageal hernia repair with mesh and gastric bypass. B    Diet advanced to soft foods   Focus on 50-60 grams of protein daily.   Encouraged water intake to 64 oz of non-carbonated/no calorie beverages daily.   Supplement with unflavored protein powder daily.   Continue PPI  No lifting greater than 20 lbs.    Follow up in 2 weeks.   RTW 1/7/2024    Pt verbalized understanding and questions were answered to the best of my knowledge and ability.    diet educational materials were provided.      BRIDGET Simmons - NP

## 2024-12-17 ENCOUNTER — TELEPHONE (OUTPATIENT)
Age: 54
End: 2024-12-17

## 2024-12-17 NOTE — TELEPHONE ENCOUNTER
I called and left a message. I informed the patient that I was calling to do my 3 week post op check. I will call you back in the morning.

## 2024-12-18 ENCOUNTER — TELEMEDICINE (OUTPATIENT)
Age: 54
End: 2024-12-18

## 2024-12-18 VITALS — WEIGHT: 230 LBS | HEIGHT: 64 IN | BODY MASS INDEX: 39.27 KG/M2

## 2024-12-18 DIAGNOSIS — E66.01 MORBID OBESITY: Primary | ICD-10-CM

## 2024-12-18 DIAGNOSIS — Z09 SURGICAL FOLLOW-UP CARE: ICD-10-CM

## 2024-12-18 PROCEDURE — 99024 POSTOP FOLLOW-UP VISIT: CPT | Performed by: NURSE PRACTITIONER

## 2024-12-18 ASSESSMENT — PATIENT HEALTH QUESTIONNAIRE - PHQ9
SUM OF ALL RESPONSES TO PHQ9 QUESTIONS 1 & 2: 0
2. FEELING DOWN, DEPRESSED OR HOPELESS: NOT AT ALL
SUM OF ALL RESPONSES TO PHQ QUESTIONS 1-9: 0
SUM OF ALL RESPONSES TO PHQ QUESTIONS 1-9: 0
1. LITTLE INTEREST OR PLEASURE IN DOING THINGS: NOT AT ALL
SUM OF ALL RESPONSES TO PHQ QUESTIONS 1-9: 0
SUM OF ALL RESPONSES TO PHQ QUESTIONS 1-9: 0

## 2024-12-18 NOTE — PROGRESS NOTES
4 weeks status post paraesophageal hernia repair with mesh and gastric bypass.   Pt reports doing well on liquids and soft foods.  .    No complaints of pain  Pt reports no nausea and no vomiting  Sheis drinking approximately 64 oz of water daily  + BM  She is drinking and eating 60  grams of protein daily.     She is taking bariatric vitamins without issue.     Total weight loss since surgery 18lbs  Weight gained  since last visit 4 lbs  Ht 1.626 m (5' 4\")   Wt 104.3 kg (230 lb)   LMP  (LMP Unknown)   BMI 39.48 kg/m²            Ms. Hooper has a reminder for a \"due or due soon\" health maintenance. I have asked that she contact her primary care provider for follow-up on this health maintenance.      Physical Examination: General appearance - alert, well appearing, and in no distress,  Chest - no respiratory distress  Abdomen - incisions healing well.     A/P    Doing well 4 weeks status post paraesophageal hernia repair with mesh and gastric bypass.   Diet advanced to soft meats   Focus on 50-60 grams of protein daily.   Encouraged water intake to 64 oz of non-carbonated/no calorie beverages daily.   Supplement with unflavored protein powder daily.   discontinue PPI  No lifting greater than 40 lbs.    Follow up in 2 weeks.       Pt verbalized understanding and questions were answered to the best of my knowledge and ability.    Jennifer Hooper, was evaluated through a synchronous (real-time) audio-video encounter. The patient (or guardian if applicable) is aware that this is a billable service, which includes applicable co-pays. This Virtual Visit was conducted with patient's (and/or legal guardian's) consent. Patient identification was verified, and a caregiver was present when appropriate.   The patient was located at Home: [address] [facility] [address]  Provider was located at Facility (Appt Dept): [address]  [place] [address]  Confirm you are appropriately licensed,

## 2024-12-18 NOTE — PROGRESS NOTES
Identified patient with two patient identifiers (name and ). Reviewed chart in preparation for visit and have obtained necessary documentation.    Jennifer Hooper is a 54 y.o. female  Chief Complaint   Patient presents with    Post-Op Check      LAPAROSCOPIC GASTRIC BYPASS WITH PARAESOPHAGEAL HERNIA REPAIR WITH MESH, ESOPHAGOGASTRODUODENOSCOPY (EGD) (E R A S)     Ht 1.626 m (5' 4\")   Wt 104.3 kg (230 lb)   LMP  (LMP Unknown)   BMI 39.48 kg/m²     1. Have you been to the ER, urgent care clinic since your last visit?  Hospitalized since your last visit?no    2. Have you seen or consulted any other health care providers outside of the [facility] since your last visit?  Include any pap smears or colon screening. no

## 2025-01-06 ENCOUNTER — TELEPHONE (OUTPATIENT)
Age: 55
End: 2025-01-06

## 2025-01-06 NOTE — TELEPHONE ENCOUNTER
Attempted to contact patient in regards to appointment tomorrow with Pushpa Bañuelos at 7:40am that needs to be moved to Janki Tenorio at 11:20am.

## 2025-01-07 ENCOUNTER — TELEMEDICINE (OUTPATIENT)
Age: 55
End: 2025-01-07

## 2025-01-07 VITALS — HEIGHT: 64 IN | WEIGHT: 217 LBS | BODY MASS INDEX: 37.05 KG/M2

## 2025-01-07 DIAGNOSIS — Z09 SURGICAL FOLLOW-UP CARE: ICD-10-CM

## 2025-01-07 DIAGNOSIS — E66.01 MORBID OBESITY: Primary | ICD-10-CM

## 2025-01-07 PROCEDURE — 99024 POSTOP FOLLOW-UP VISIT: CPT | Performed by: NURSE PRACTITIONER

## 2025-01-07 RX ORDER — ONDANSETRON 4 MG/1
4 TABLET, ORALLY DISINTEGRATING ORAL EVERY 8 HOURS PRN
Qty: 30 TABLET | Refills: 1 | Status: SHIPPED | OUTPATIENT
Start: 2025-01-07

## 2025-01-07 ASSESSMENT — PATIENT HEALTH QUESTIONNAIRE - PHQ9
2. FEELING DOWN, DEPRESSED OR HOPELESS: NOT AT ALL
SUM OF ALL RESPONSES TO PHQ9 QUESTIONS 1 & 2: 0
SUM OF ALL RESPONSES TO PHQ QUESTIONS 1-9: 0
1. LITTLE INTEREST OR PLEASURE IN DOING THINGS: NOT AT ALL
SUM OF ALL RESPONSES TO PHQ QUESTIONS 1-9: 0

## 2025-01-07 NOTE — PROGRESS NOTES
6 weeks status post gastric bypass.    Pt reports doing well on liquids .    She was able to eat on Ravinder day. She ate tuna.   Ever since Christmas she has been sick. Everything she eats makes her nauseated and she does not want to eat.   When she does eat she has diarrhea.   She has been the bed for days.   No complaints of pain  Pt reports no vomiting, + Nausea with everything  Sheis drinking approximately 64 oz of water daily, unsweet tea  + BM. loose  She is not drinking shakes or eating protein due to nausea.   She cannot find her nausea pills.   She has not taken her vitamins in 2 weeks due to nausea    Total weight loss since surgery 22lbs  Weight loss since last visit 4lbs  Ht 1.626 m (5' 4\")   Wt 98.4 kg (217 lb)   LMP  (LMP Unknown)   BMI 37.25 kg/m²            Ms. Hooper has a reminder for a \"due or due soon\" health maintenance. I have asked that she contact her primary care provider for follow-up on this health maintenance.      Physical Examination: General appearance - alert, well appearing, and in no distress,  Chest - no respiratory distress    Abdomen - incisions healing well.     A/P    Nausea 6 weeks status post laparoscopic Gastric Bypass      Encouraged water intake to 64 oz of non-carbonated/no calorie beverages daily. Stay hydrated.   Zofran 4 mg ODT sent to pharmacy. Will have my nurse follow up with her tomorrow to see if we need to add another nausea medication  Supplement with unflavored protein powder daily.   Continue PPI if tolerated.         Pt verbalized understanding and questions were answered to the best of my knowledge and ability.    Jennifer Hooper, was evaluated through a synchronous (real-time) audio-video encounter. The patient (or guardian if applicable) is aware that this is a billable service, which includes applicable co-pays. This Virtual Visit was conducted with patient's (and/or legal guardian's) consent. Patient identification was verified, and a caregiver

## 2025-01-07 NOTE — PROGRESS NOTES
Identified patient with two patient identifiers (name and ). Reviewed chart in preparation for visit and have obtained necessary documentation.    Jennifer Hooper is a 54 y.o. female  No chief complaint on file.    LMP  (LMP Unknown)     1. Have you been to the ER, urgent care clinic since your last visit?  Hospitalized since your last visit?no    2. Have you seen or consulted any other health care providers outside of the [facility] since your last visit?  Include any pap smears or colon screening. No    Patient complained of not feeling well for the past several weeks. Currently not taking any medications due to not being able to tolerate.

## 2025-01-08 ENCOUNTER — TELEPHONE (OUTPATIENT)
Age: 55
End: 2025-01-08

## 2025-01-08 NOTE — TELEPHONE ENCOUNTER
I called and spoke with the patient. I told her NP Jona wanted me to follow up today and check on your progress. She informed me that she is feeling much better. The Zofran really helped. She has been able to eat today and take her medications. I told her if you have any further concerns please call. I will route my call to Janki. She acknowledged understanding and thanked me for the call.

## 2025-06-02 ENCOUNTER — TELEPHONE (OUTPATIENT)
Age: 55
End: 2025-06-02

## 2025-06-02 NOTE — TELEPHONE ENCOUNTER
LM for pt to call and schedule 8 month bariatric exam. Letter sent. Melbosst message also sent. [facility]

## 2025-06-04 ENCOUNTER — TELEMEDICINE (OUTPATIENT)
Age: 55
End: 2025-06-04
Payer: COMMERCIAL

## 2025-06-04 VITALS — WEIGHT: 178 LBS | BODY MASS INDEX: 31.54 KG/M2 | HEIGHT: 63 IN

## 2025-06-04 DIAGNOSIS — E55.9 VITAMIN D DEFICIENCY: ICD-10-CM

## 2025-06-04 DIAGNOSIS — E66.01 MORBID OBESITY (HCC): Primary | ICD-10-CM

## 2025-06-04 DIAGNOSIS — K91.2 POSTSURGICAL NONABSORPTION: ICD-10-CM

## 2025-06-04 DIAGNOSIS — E53.8 VITAMIN B12 DEFICIENCY: ICD-10-CM

## 2025-06-04 DIAGNOSIS — D64.9 ANEMIA, UNSPECIFIED TYPE: ICD-10-CM

## 2025-06-04 DIAGNOSIS — Z98.84 S/P GASTRIC BYPASS: ICD-10-CM

## 2025-06-04 PROCEDURE — 99213 OFFICE O/P EST LOW 20 MIN: CPT | Performed by: NURSE PRACTITIONER

## 2025-06-04 RX ORDER — BUSPIRONE HYDROCHLORIDE 10 MG/1
10 TABLET ORAL 2 TIMES DAILY
COMMUNITY
Start: 2025-05-15

## 2025-06-04 ASSESSMENT — PATIENT HEALTH QUESTIONNAIRE - PHQ9
2. FEELING DOWN, DEPRESSED OR HOPELESS: NOT AT ALL
SUM OF ALL RESPONSES TO PHQ QUESTIONS 1-9: 0
1. LITTLE INTEREST OR PLEASURE IN DOING THINGS: NOT AT ALL
SUM OF ALL RESPONSES TO PHQ QUESTIONS 1-9: 0

## 2025-06-04 ASSESSMENT — ENCOUNTER SYMPTOMS
DIARRHEA: 0
VOMITING: 0
NAUSEA: 0
CHEST TIGHTNESS: 0
SHORTNESS OF BREATH: 0
ABDOMINAL PAIN: 0

## 2025-06-04 NOTE — PROGRESS NOTES
Jennifer Hooper (:  1970) is a 54 y.o. female,Established patient, here for evaluation of the following chief complaint(s):  Weight Management (8 month Bariatric Exam)        SUBJECTIVE/OBJECTIVE:    HPI:  Jennifer Hooper is a 54 y.o. female with previous Gastric bypass surgery on 8 months ago. . She has lost a total of 70 pounds since surgery. She  has lost 39 lbs since the last ov.  Body mass index is 31.53 kg/m².. no nausea and no vomiting. Uses Zofran if needed. Denies Acid reflux/heartburn.. Drinking  64 ounces of water daily. 60  protein intake daily. + BM's. Pt is swimming and walking for exercise. Off one cholesterol and HTN med  Dietary recall -    Breakfast- protein shake, eggs  Lunch-turkey roll and cheese, cottage cheese with fruit  Dinner- chicken, green beans    She is  snacking between meals; chicken.      Vitamins:  MVI : yes  Calcium : yes  B-Vit 12: yes  Vit D: Yes          Ms. Hooper has a reminder for a \"due or due soon\" health maintenance. I have asked that she contact her primary care provider for follow-up on this health maintenance.        COMORBIDITY     SLEEP APNEA                 NO        GERD  (req.meds)            NO  HYPERLIPIDEMIA            YES  HYPERTENSION              YES         DIABETES                         NO           Current Outpatient Medications:     busPIRone (BUSPAR) 10 MG tablet, Take 1 tablet by mouth in the morning and at bedtime, Disp: , Rfl:     calcium carbonate 1500 (600 Ca) MG TABS tablet, Take 1 tablet by mouth 2 times daily (with meals), Disp: , Rfl:     Multiple Vitamins-Minerals (BARIATRIC MULTIVITAMINS/IRON PO), Take by mouth daily Takes 1 tablet by mouth once daily, Disp: , Rfl:     CALCIUM PO, Take 600 mg by mouth 2 times daily, Disp: , Rfl:     lisinopril-hydroCHLOROthiazide (PRINZIDE;ZESTORETIC) 20-12.5 MG per tablet, Take 2 tablets by mouth every morning, Disp: , Rfl:     citalopram (CELEXA) 40 MG tablet, Take 1 tablet by mouth every

## 2025-06-04 NOTE — PROGRESS NOTES
Identified patient with two patient identifiers (name and ). Reviewed chart in preparation for visit and have obtained necessary documentation.    Jennifer Hooper is a 54 y.o. female  Chief Complaint   Patient presents with    Weight Management     8 month Bariatric Exam     Ht 1.6 m (5' 3\")   Wt 80.7 kg (178 lb)   LMP  (LMP Unknown)   BMI 31.53 kg/m²     1. Have you been to the ER, urgent care clinic since your last visit?  Hospitalized since your last visit?no    2. Have you seen or consulted any other health care providers outside of the [facility] since your last visit?  Include any pap smears or colon screening. no

## (undated) DEVICE — STAPLES INT L60MM M THCK REINF INTELLIGENT RELD FOR SIGNIA

## (undated) DEVICE — GLOVE SURG SZ 8 L12IN FNGR THK79MIL GRN LTX FREE

## (undated) DEVICE — DEVICE SUT FOR TRCR SITE INCIS ENDO CLOSE

## (undated) DEVICE — SUTURE VICRYL + SZ 0 L27IN ABSRB VLT L26MM UR-6 5/8 CIR VCP603H

## (undated) DEVICE — SOLUTION IV 1000ML 0.9% SOD CHL PH 5 INJ USP VIAFLX PLAS

## (undated) DEVICE — SHELL STPL PWR FOR SIGNIA HNDL STPL SYS

## (undated) DEVICE — LAPAROSCOPIC TROCAR SLEEVE/SINGLE USE: Brand: KII® OPTICAL ACCESS SYSTEM

## (undated) DEVICE — TROCAR: Brand: KII® OPTICAL ACCESS SYSTEM

## (undated) DEVICE — GLOVE ORANGE PI 7   MSG9070

## (undated) DEVICE — SYRINGE MED 30ML STD CLR PLAS LUERLOCK TIP N CTRL DISP

## (undated) DEVICE — SOLUTION ANTIFOG VIS SYS CLEARIFY LAPSCP

## (undated) DEVICE — 4-PORT MANIFOLD: Brand: NEPTUNE 2

## (undated) DEVICE — DRAIN SURG PENROSE 0.25X18 IN CLOSED WND DRAINAGE PREM SIL

## (undated) DEVICE — SUTURE VICRYL + SZ 2-0 L27IN ABSRB WHT SH 1/2 CIR TAPERCUT VCP417H

## (undated) DEVICE — ENDO CARRY-ON PROCEDURE KIT INCLUDES ENZYMATIC SPONGE, GAUZE, BIOHAZARD LABEL, TRAY, LUBRICANT, DIRTY SCOPE LABEL, WATER LABEL, TRAY, DRAWSTRING PAD, AND DEFENDO 4-PIECE KIT.: Brand: ENDO CARRY-ON PROCEDURE KIT

## (undated) DEVICE — DISSECTOR SURG US 48 CM CRV JAW CRDLSS STRL SONICISION 7 LF

## (undated) DEVICE — SUTURE NONABSORBABLE MONOFILAMENT 2-0 V-20 6 IN V-LOC PBT VLOCN0605

## (undated) DEVICE — GOWN,SIRUS,FABRNF,XL,20/CS: Brand: MEDLINE

## (undated) DEVICE — SYRINGE MED 10ML LUERLOCK TIP W/O SFTY DISP

## (undated) DEVICE — TROCAR: Brand: KII® SLEEVE

## (undated) DEVICE — SUTURE V-LOC 180 SZ 3-0 L6IN ABSRB VLT CV-23 L17MM 1/2 CIR VLOCM0804

## (undated) DEVICE — HYPODERMIC SAFETY NEEDLE: Brand: MAGELLAN

## (undated) DEVICE — Device

## (undated) DEVICE — COVER LT HNDL PLAS RIG 1 PER PK

## (undated) DEVICE — LAP-BAND SYSTEM CALIBRATION TUBE: Brand: LAP-BAND

## (undated) DEVICE — APPLICATOR MEDICATED 26 CC SOLUTION CLR STRL CHLORAPREP

## (undated) DEVICE — SYSTEM EVAC SMOKE LAPARSCOPIC

## (undated) DEVICE — GARMENT,MEDLINE,DVT,INT,CALF,MED, GEN2: Brand: MEDLINE

## (undated) DEVICE — CLICKLINE SCISSORS INSERT: Brand: CLICKLINE

## (undated) DEVICE — RELOAD STPL 2.5MM L60MM 0DEG VASC TISS TAN TI 6 ROW LIN

## (undated) DEVICE — LIQUIBAND RAPID ADHESIVE 36/CS 0.8ML: Brand: MEDLINE

## (undated) DEVICE — RELOAD STPL 45MM THN VASC TISS WHT W/ GRIPPING SURF

## (undated) DEVICE — X-RAY DETECTABLE SPONGES,16 PLY: Brand: VISTEC

## (undated) DEVICE — SUTURE MONOCRYL + SZ 4-0 L27IN ABSRB UD L19MM PS-2 3/8 CIR MCP426H

## (undated) DEVICE — GENERAL LAPAROSCOPY - SMH: Brand: MEDLINE INDUSTRIES, INC.

## (undated) DEVICE — SUTURE VICRYL + 3-0 VLT BRN SH NDL VC316H

## (undated) DEVICE — GLOVE ORANGE PI 7 1/2   MSG9075